# Patient Record
Sex: FEMALE | Race: BLACK OR AFRICAN AMERICAN | NOT HISPANIC OR LATINO | URBAN - METROPOLITAN AREA
[De-identification: names, ages, dates, MRNs, and addresses within clinical notes are randomized per-mention and may not be internally consistent; named-entity substitution may affect disease eponyms.]

---

## 2021-09-01 ENCOUNTER — INPATIENT (INPATIENT)
Facility: HOSPITAL | Age: 71
LOS: 6 days | Discharge: HOME | DRG: 872 | End: 2021-09-08
Attending: INTERNAL MEDICINE | Admitting: INTERNAL MEDICINE
Payer: MEDICARE

## 2021-09-01 VITALS
SYSTOLIC BLOOD PRESSURE: 158 MMHG | WEIGHT: 199.96 LBS | TEMPERATURE: 98 F | DIASTOLIC BLOOD PRESSURE: 72 MMHG | HEART RATE: 100 BPM | HEIGHT: 62 IN | RESPIRATION RATE: 18 BRPM | OXYGEN SATURATION: 97 %

## 2021-09-01 DIAGNOSIS — K11.20 SIALOADENITIS, UNSPECIFIED: ICD-10-CM

## 2021-09-01 DIAGNOSIS — Z89.512 ACQUIRED ABSENCE OF LEFT LEG BELOW KNEE: Chronic | ICD-10-CM

## 2021-09-01 LAB
ALBUMIN SERPL ELPH-MCNC: 3.5 G/DL — SIGNIFICANT CHANGE UP (ref 3.3–5)
ALP SERPL-CCNC: 82 U/L — SIGNIFICANT CHANGE UP (ref 40–120)
ALT FLD-CCNC: 24 U/L — SIGNIFICANT CHANGE UP (ref 10–45)
ANION GAP SERPL CALC-SCNC: 14 MMOL/L — SIGNIFICANT CHANGE UP (ref 5–17)
ANION GAP SERPL CALC-SCNC: 16 MMOL/L — SIGNIFICANT CHANGE UP (ref 5–17)
AST SERPL-CCNC: 50 U/L — HIGH (ref 10–40)
BASE EXCESS BLDV CALC-SCNC: 5.3 MMOL/L — HIGH (ref -2–2)
BASOPHILS # BLD AUTO: 0.05 K/UL — SIGNIFICANT CHANGE UP (ref 0–0.2)
BASOPHILS NFR BLD AUTO: 0.4 % — SIGNIFICANT CHANGE UP (ref 0–2)
BILIRUB SERPL-MCNC: 0.4 MG/DL — SIGNIFICANT CHANGE UP (ref 0.2–1.2)
BUN SERPL-MCNC: 10 MG/DL — SIGNIFICANT CHANGE UP (ref 7–23)
BUN SERPL-MCNC: 12 MG/DL — SIGNIFICANT CHANGE UP (ref 7–23)
CA-I SERPL-SCNC: 1.15 MMOL/L — SIGNIFICANT CHANGE UP (ref 1.15–1.33)
CALCIUM SERPL-MCNC: 9.2 MG/DL — SIGNIFICANT CHANGE UP (ref 8.4–10.5)
CALCIUM SERPL-MCNC: 9.2 MG/DL — SIGNIFICANT CHANGE UP (ref 8.4–10.5)
CHLORIDE BLDV-SCNC: 104 MMOL/L — SIGNIFICANT CHANGE UP (ref 96–108)
CHLORIDE SERPL-SCNC: 100 MMOL/L — SIGNIFICANT CHANGE UP (ref 96–108)
CHLORIDE SERPL-SCNC: 99 MMOL/L — SIGNIFICANT CHANGE UP (ref 96–108)
CO2 BLDV-SCNC: 31 MMOL/L — HIGH (ref 22–26)
CO2 SERPL-SCNC: 23 MMOL/L — SIGNIFICANT CHANGE UP (ref 22–31)
CO2 SERPL-SCNC: 25 MMOL/L — SIGNIFICANT CHANGE UP (ref 22–31)
CREAT SERPL-MCNC: 0.85 MG/DL — SIGNIFICANT CHANGE UP (ref 0.5–1.3)
CREAT SERPL-MCNC: 0.95 MG/DL — SIGNIFICANT CHANGE UP (ref 0.5–1.3)
EOSINOPHIL # BLD AUTO: 0.32 K/UL — SIGNIFICANT CHANGE UP (ref 0–0.5)
EOSINOPHIL NFR BLD AUTO: 2.3 % — SIGNIFICANT CHANGE UP (ref 0–6)
GAS PNL BLDV: 134 MMOL/L — LOW (ref 136–145)
GAS PNL BLDV: SIGNIFICANT CHANGE UP
GAS PNL BLDV: SIGNIFICANT CHANGE UP
GLUCOSE BLDV-MCNC: 123 MG/DL — HIGH (ref 70–99)
GLUCOSE SERPL-MCNC: 128 MG/DL — HIGH (ref 70–99)
GLUCOSE SERPL-MCNC: 242 MG/DL — HIGH (ref 70–99)
HCO3 BLDV-SCNC: 30 MMOL/L — HIGH (ref 22–29)
HCT VFR BLD CALC: 36 % — SIGNIFICANT CHANGE UP (ref 34.5–45)
HCT VFR BLDA CALC: 35 % — SIGNIFICANT CHANGE UP (ref 34.5–46.5)
HGB BLD CALC-MCNC: 11.6 G/DL — LOW (ref 11.7–16.1)
HGB BLD-MCNC: 11.1 G/DL — LOW (ref 11.5–15.5)
IMM GRANULOCYTES NFR BLD AUTO: 0.6 % — SIGNIFICANT CHANGE UP (ref 0–1.5)
LACTATE BLDV-MCNC: 1.1 MMOL/L — SIGNIFICANT CHANGE UP (ref 0.7–2)
LYMPHOCYTES # BLD AUTO: 14.8 % — SIGNIFICANT CHANGE UP (ref 13–44)
LYMPHOCYTES # BLD AUTO: 2.1 K/UL — SIGNIFICANT CHANGE UP (ref 1–3.3)
MCHC RBC-ENTMCNC: 26.4 PG — LOW (ref 27–34)
MCHC RBC-ENTMCNC: 30.8 GM/DL — LOW (ref 32–36)
MCV RBC AUTO: 85.5 FL — SIGNIFICANT CHANGE UP (ref 80–100)
MONOCYTES # BLD AUTO: 0.94 K/UL — HIGH (ref 0–0.9)
MONOCYTES NFR BLD AUTO: 6.6 % — SIGNIFICANT CHANGE UP (ref 2–14)
NEUTROPHILS # BLD AUTO: 10.69 K/UL — HIGH (ref 1.8–7.4)
NEUTROPHILS NFR BLD AUTO: 75.3 % — SIGNIFICANT CHANGE UP (ref 43–77)
NRBC # BLD: 0 /100 WBCS — SIGNIFICANT CHANGE UP (ref 0–0)
PCO2 BLDV: 43 MMHG — HIGH (ref 39–42)
PH BLDV: 7.45 — HIGH (ref 7.32–7.43)
PLATELET # BLD AUTO: 236 K/UL — SIGNIFICANT CHANGE UP (ref 150–400)
PO2 BLDV: 37 MMHG — SIGNIFICANT CHANGE UP (ref 25–45)
POTASSIUM BLDV-SCNC: 4.9 MMOL/L — SIGNIFICANT CHANGE UP (ref 3.5–5.1)
POTASSIUM SERPL-MCNC: 4.2 MMOL/L — SIGNIFICANT CHANGE UP (ref 3.5–5.3)
POTASSIUM SERPL-MCNC: 5.8 MMOL/L — HIGH (ref 3.5–5.3)
POTASSIUM SERPL-SCNC: 4.2 MMOL/L — SIGNIFICANT CHANGE UP (ref 3.5–5.3)
POTASSIUM SERPL-SCNC: 5.8 MMOL/L — HIGH (ref 3.5–5.3)
PROT SERPL-MCNC: 8.3 G/DL — SIGNIFICANT CHANGE UP (ref 6–8.3)
RBC # BLD: 4.21 M/UL — SIGNIFICANT CHANGE UP (ref 3.8–5.2)
RBC # FLD: 15.9 % — HIGH (ref 10.3–14.5)
SAO2 % BLDV: 65.9 % — LOW (ref 67–88)
SARS-COV-2 RNA SPEC QL NAA+PROBE: SIGNIFICANT CHANGE UP
SODIUM SERPL-SCNC: 138 MMOL/L — SIGNIFICANT CHANGE UP (ref 135–145)
SODIUM SERPL-SCNC: 139 MMOL/L — SIGNIFICANT CHANGE UP (ref 135–145)
WBC # BLD: 14.19 K/UL — HIGH (ref 3.8–10.5)
WBC # FLD AUTO: 14.19 K/UL — HIGH (ref 3.8–10.5)

## 2021-09-01 PROCEDURE — 70487 CT MAXILLOFACIAL W/DYE: CPT | Mod: 26,MG

## 2021-09-01 PROCEDURE — G1004: CPT

## 2021-09-01 RX ORDER — MORPHINE SULFATE 50 MG/1
2 CAPSULE, EXTENDED RELEASE ORAL EVERY 6 HOURS
Refills: 0 | Status: DISCONTINUED | OUTPATIENT
Start: 2021-09-01 | End: 2021-09-01

## 2021-09-01 RX ORDER — AMPICILLIN SODIUM AND SULBACTAM SODIUM 250; 125 MG/ML; MG/ML
3 INJECTION, POWDER, FOR SUSPENSION INTRAMUSCULAR; INTRAVENOUS ONCE
Refills: 0 | Status: COMPLETED | OUTPATIENT
Start: 2021-09-01 | End: 2021-09-01

## 2021-09-01 RX ORDER — DEXAMETHASONE 0.5 MG/5ML
10 ELIXIR ORAL ONCE
Refills: 0 | Status: COMPLETED | OUTPATIENT
Start: 2021-09-01 | End: 2021-09-01

## 2021-09-01 RX ORDER — MORPHINE SULFATE 50 MG/1
4 CAPSULE, EXTENDED RELEASE ORAL ONCE
Refills: 0 | Status: DISCONTINUED | OUTPATIENT
Start: 2021-09-01 | End: 2021-09-01

## 2021-09-01 RX ORDER — DEXAMETHASONE 0.5 MG/5ML
10 ELIXIR ORAL EVERY 8 HOURS
Refills: 0 | Status: DISCONTINUED | OUTPATIENT
Start: 2021-09-01 | End: 2021-09-02

## 2021-09-01 RX ORDER — KETOROLAC TROMETHAMINE 30 MG/ML
15 SYRINGE (ML) INJECTION ONCE
Refills: 0 | Status: DISCONTINUED | OUTPATIENT
Start: 2021-09-01 | End: 2021-09-01

## 2021-09-01 RX ORDER — ATORVASTATIN CALCIUM 80 MG/1
40 TABLET, FILM COATED ORAL AT BEDTIME
Refills: 0 | Status: DISCONTINUED | OUTPATIENT
Start: 2021-09-01 | End: 2021-09-08

## 2021-09-01 RX ORDER — AMPICILLIN SODIUM AND SULBACTAM SODIUM 250; 125 MG/ML; MG/ML
3 INJECTION, POWDER, FOR SUSPENSION INTRAMUSCULAR; INTRAVENOUS EVERY 6 HOURS
Refills: 0 | Status: DISCONTINUED | OUTPATIENT
Start: 2021-09-01 | End: 2021-09-04

## 2021-09-01 RX ORDER — NIFEDIPINE 30 MG
60 TABLET, EXTENDED RELEASE 24 HR ORAL DAILY
Refills: 0 | Status: DISCONTINUED | OUTPATIENT
Start: 2021-09-01 | End: 2021-09-08

## 2021-09-01 RX ORDER — SODIUM CHLORIDE 9 MG/ML
1000 INJECTION, SOLUTION INTRAVENOUS
Refills: 0 | Status: DISCONTINUED | OUTPATIENT
Start: 2021-09-01 | End: 2021-09-02

## 2021-09-01 RX ORDER — KETOROLAC TROMETHAMINE 30 MG/ML
30 SYRINGE (ML) INJECTION ONCE
Refills: 0 | Status: DISCONTINUED | OUTPATIENT
Start: 2021-09-01 | End: 2021-09-01

## 2021-09-01 RX ORDER — ACETAMINOPHEN 500 MG
1000 TABLET ORAL ONCE
Refills: 0 | Status: COMPLETED | OUTPATIENT
Start: 2021-09-01 | End: 2021-09-01

## 2021-09-01 RX ADMIN — MORPHINE SULFATE 4 MILLIGRAM(S): 50 CAPSULE, EXTENDED RELEASE ORAL at 11:42

## 2021-09-01 RX ADMIN — AMPICILLIN SODIUM AND SULBACTAM SODIUM 200 GRAM(S): 250; 125 INJECTION, POWDER, FOR SUSPENSION INTRAMUSCULAR; INTRAVENOUS at 09:37

## 2021-09-01 RX ADMIN — Medication 102 MILLIGRAM(S): at 17:38

## 2021-09-01 RX ADMIN — Medication 400 MILLIGRAM(S): at 08:45

## 2021-09-01 RX ADMIN — Medication 10 MILLIGRAM(S): at 11:42

## 2021-09-01 RX ADMIN — MORPHINE SULFATE 4 MILLIGRAM(S): 50 CAPSULE, EXTENDED RELEASE ORAL at 10:58

## 2021-09-01 RX ADMIN — Medication 15 MILLIGRAM(S): at 11:44

## 2021-09-01 RX ADMIN — AMPICILLIN SODIUM AND SULBACTAM SODIUM 200 GRAM(S): 250; 125 INJECTION, POWDER, FOR SUSPENSION INTRAMUSCULAR; INTRAVENOUS at 21:05

## 2021-09-01 RX ADMIN — AMPICILLIN SODIUM AND SULBACTAM SODIUM 200 GRAM(S): 250; 125 INJECTION, POWDER, FOR SUSPENSION INTRAMUSCULAR; INTRAVENOUS at 15:19

## 2021-09-01 RX ADMIN — Medication 1000 MILLIGRAM(S): at 10:00

## 2021-09-01 RX ADMIN — MORPHINE SULFATE 2 MILLIGRAM(S): 50 CAPSULE, EXTENDED RELEASE ORAL at 14:56

## 2021-09-01 RX ADMIN — Medication 102 MILLIGRAM(S): at 10:58

## 2021-09-01 RX ADMIN — AMPICILLIN SODIUM AND SULBACTAM SODIUM 3 GRAM(S): 250; 125 INJECTION, POWDER, FOR SUSPENSION INTRAMUSCULAR; INTRAVENOUS at 10:54

## 2021-09-01 RX ADMIN — Medication 1000 MILLIGRAM(S): at 09:45

## 2021-09-01 RX ADMIN — SODIUM CHLORIDE 125 MILLILITER(S): 9 INJECTION, SOLUTION INTRAVENOUS at 15:18

## 2021-09-01 NOTE — ED CDU PROVIDER INITIAL DAY NOTE - ATTENDING CONTRIBUTION TO CARE
Attending MD Maldonado:   I personally have seen and examined this patient.  Physician assistant note reviewed and agree on plan of care and except where noted.

## 2021-09-01 NOTE — ED CDU PROVIDER INITIAL DAY NOTE - DETAILS
70y f p/w sialodochitis and facial cellulitis:  -IV unasyn, decadron, ENT following, am labs    d/w ED team

## 2021-09-01 NOTE — ED CDU PROVIDER DISPOSITION NOTE - CLINICAL COURSE
71 yo female with PMHx of HTN, HLD, PAD s/p Left BKA p/w right facial swelling.  Patient reports that she has been having dental work over the past few weeks.  Everything had been going well up until last week.  Patient was seen by her dentist again last Tuesday, two days later she noticed right facial swelling.  Went back to her dentist who sent her to an oral surgeon yesterday.  Oral surgeon recommended patient come to the ER.  Patient denies fevers/chills, difficulty breathing/swallowing, CP, SOB, abd pain, NVD.  Patient was started on Amoxicillin prior to procedure and completed course last week without improvement.  In ED pt had leukocytosis of 14, CT max/face revealing for sialodochitis with facial cellulitis, limited eval of stone due to artifact. Pt was seen by ENT recommended IV unasyn and decadron. Pt went to CDU for further management  In CDU, 69 yo female with PMHx of HTN, HLD, PAD s/p Left BKA p/w right facial swelling.  Patient reports that she has been having dental work over the past few weeks.  Everything had been going well up until last week.  Patient was seen by her dentist again last Tuesday, two days later she noticed right facial swelling.  Went back to her dentist who sent her to an oral surgeon yesterday.  Oral surgeon recommended patient come to the ER.  Patient denies fevers/chills, difficulty breathing/swallowing, CP, SOB, abd pain, NVD.  Patient was started on Amoxicillin prior to procedure and completed course last week without improvement.  In ED pt had leukocytosis of 14, CT max/face revealing for sialodochitis with facial cellulitis, limited eval of stone due to artifact. Pt was seen by ENT recommended IV unasyn and decadron. Pt went to CDU for further management  In CDU, patient's symptoms slightly improved, but still with significant amount of facial swelling.  ENT re-evaluation recommending admission for continued IV abx and steroids.

## 2021-09-01 NOTE — ED ADULT TRIAGE NOTE - CHIEF COMPLAINT QUOTE
swelling on right side of face. Saw dentist and surgeon who informed her it was beyond the capabilities of the office and to come to the ED. Able to manage secretions

## 2021-09-01 NOTE — ED ADULT NURSE NOTE - NSIMPLEMENTINTERV_GEN_ALL_ED
Implemented All Fall Risk Interventions:  [place] to call system. Call bell, personal items and telephone within reach. Instruct patient to call for assistance. Room bathroom lighting operational. Non-slip footwear when patient is off stretcher. Physically safe environment: no spills, clutter or unnecessary equipment. Stretcher in lowest position, wheels locked, appropriate side rails in place. Provide visual cue, wrist band, yellow gown, etc. Monitor gait and stability. Monitor for mental status changes and reorient to person, place, and time. Review medications for side effects contributing to fall risk. Reinforce activity limits and safety measures with patient and family.

## 2021-09-01 NOTE — ED CDU PROVIDER INITIAL DAY NOTE - PROGRESS NOTE DETAILS
CDU PROGRESS NOTE REY BURRIS: pt resting comfortably, pain well controlled. NAD. VSS. Will continue to monitor. Patient seen at bedside. Patient resting comfortably, no complaints. Able to PO tolerate. Per patient states that she feels like she is improving.  Will reevaluate. - Sabina Ag PA-C

## 2021-09-01 NOTE — ED CDU PROVIDER INITIAL DAY NOTE - MEDICAL DECISION MAKING DETAILS
Attending MD Maldonado: 71 yo female with PMH sign for HTN, HLD, PAD and L BKA presents with right sided facial swelling sp dental work over last few weeks.  Seen by OMFS today and sent to ED.  Patient denies fever, chills, nausea, vomiting, or diarrhea. On exam there is significant right sided facial swelling and trismus.  Maxillofacial CT  ordered and showed  Tubular rim-enhancing collection within the expected course of the right parotid duct, most compatible with sialodochitis and adjacent cellulitis.  Patient started on IV abx and ENT consulted.  Recommended CDU for continued IV abx and observation.

## 2021-09-01 NOTE — CONSULT NOTE ADULT - ATTENDING COMMENTS
pt with severe right sided sialoadenitis with obstructed duct full of fluid/ saliva. having some improvement on abx over the day, not yet draining, discussed probing and dilating duct, pt very very tender and would like to see if will continue to improve and may decompress. will add some steroids, if does not continue to improve/ se agrees to try dilation or drainage

## 2021-09-01 NOTE — ED PROVIDER NOTE - CLINICAL SUMMARY MEDICAL DECISION MAKING FREE TEXT BOX
Attending MD Maldonado: 71 yo female with PMH sign for HTN, HLD, PAD and L BKA presents with right sided facial swelling sp dental work over last few weeks.  Seen by OMFS today and sent to ED.  Patient denies fever, chills, nausea, vomiting, or diarrhea. On exam there is significant right sided facial swelling and trismus.  Maxillofacial CT  ordered and showed  Tubular rim-enhancing collection within the expected course of the right parotid duct, most compatible with sialodochitis and adjacent cellulitis.  Patient started on IV abx and ENT consulted.

## 2021-09-01 NOTE — ED CDU PROVIDER DISPOSITION NOTE - ATTENDING CONTRIBUTION TO CARE
Attending Bulmaro: pt re-evaluated by ENT in AM of 9/2, recommending medical admission for continued treatment and management

## 2021-09-01 NOTE — ED PROVIDER NOTE - ATTENDING CONTRIBUTION TO CARE
Attending MD Maldonado:  I personally have seen and examined this patient.  Resident note reviewed and agree on plan of care and except where noted. Attending MD Maldonado:  I personally have seen and examined this patient.  PA note reviewed and agree on plan of care and except where noted.

## 2021-09-01 NOTE — ED ADULT NURSE NOTE - OBJECTIVE STATEMENT
69 y/o F, PMH HTN, HLD, PAD s/p L BKA, presents to ED c/o R jaw/cheek pain and swelling since Thursday in the setting of getting recent dental work on Tuesday 2 days prior. Pt has significant swelling from R cheek towards R ear and down R jaw. Pt denies dysphagia, wheezing, SOB, difficulty speaking/breathing, fevers, chills. Pt speaking in full clear sentences, maintaining secretions. Safety maintained.

## 2021-09-01 NOTE — ED PROVIDER NOTE - PHYSICAL EXAMINATION
Gen: AAO x 3, NAD  Skin: No rashes or lesions  HEENT: NC/AT, PERRLA, EOMI, MMM, right facial swelling, induration, +TTP, no fluctuance noted.  Submandibular area soft, nontender.    Resp: unlabored CTAB  Cardiac: rrr s1s2, no murmurs, rubs or gallops  GI: ND, +BS, Soft, NT  Ext: Left BKA, FROM in all extremities  Neuro: no focal deficits

## 2021-09-01 NOTE — ED CDU PROVIDER DISPOSITION NOTE - NSFOLLOWUPINSTRUCTIONS_ED_ALL_ED_FT
Please follow up with your primary care doctor within 1 week.  Follow up with ENT within 1 week    *Bring all printed lab/test results to your appointment(s).*    Take antibiotics as prescribed (Please read all medication information/instructions).    Return to the ED for worsening swelling, difficulty swallowing, fever, chills, or any other concerns.

## 2021-09-01 NOTE — ED PROVIDER NOTE - OBJECTIVE STATEMENT
71 yo female with PMHx of HTN, HLD, PAD s/p Left BKA p/w right facial swelling.  Patient reports that she has been having dental work over the past few weeks.  Everything had been going well up until last week.  Patient was seen by her dentist again last Tuesday, two days later she noticed right facial swelling.  Went back to her dentist who sent her to an oral surgeon yesterday.  Oral surgeon recommended patient come to the ER.  Patient denies fevers/chills, difficulty breathing/swallowing, CP, SOB, abd pain, NVD.  Patient was started on Amoxicillin prior to procedure and completed course last week without improvement.

## 2021-09-01 NOTE — ED CDU PROVIDER INITIAL DAY NOTE - OBJECTIVE STATEMENT
69 yo female with PMHx of HTN, HLD, PAD s/p Left BKA p/w right facial swelling.  Patient reports that she has been having dental work over the past few weeks.  Everything had been going well up until last week.  Patient was seen by her dentist again last Tuesday, two days later she noticed right facial swelling.  Went back to her dentist who sent her to an oral surgeon yesterday.  Oral surgeon recommended patient come to the ER.  Patient denies fevers/chills, difficulty breathing/swallowing, CP, SOB, abd pain, NVD.  Patient was started on Amoxicillin prior to procedure and completed course last week without improvement.  In ED pt had leukocytosis of 14, CT max/face revealing for sialodochitis with facial cellulitis, limited eval of stone due to artifact. Pt was seen by ENT recommended IV unasyn and decadron. Pt went to CDU for further management

## 2021-09-01 NOTE — CONSULT NOTE ADULT - PROBLEM SELECTOR RECOMMENDATION 9
-CDU for IV abx and decadron  -S/p 1 dose of IV unasyn on arrival, continue w abx  -Decadron 10 mg q 8 hours x at least 24 hours  -Further disposition dependent on response to iV medications  -Warm compress to R face at least 4 times daily  -PO intake encouraged, soft diet as tolerated  -Adequate hydration  -Sialogogues (sour candies, lemon etc.) to stimulate salivary flow  -Will work to obtain culture if pus expressed from stensons duct   -Pain control

## 2021-09-02 DIAGNOSIS — E78.5 HYPERLIPIDEMIA, UNSPECIFIED: ICD-10-CM

## 2021-09-02 DIAGNOSIS — I10 ESSENTIAL (PRIMARY) HYPERTENSION: ICD-10-CM

## 2021-09-02 DIAGNOSIS — K11.20 SIALOADENITIS, UNSPECIFIED: ICD-10-CM

## 2021-09-02 DIAGNOSIS — Z96.642 PRESENCE OF LEFT ARTIFICIAL HIP JOINT: Chronic | ICD-10-CM

## 2021-09-02 DIAGNOSIS — Z89.512 ACQUIRED ABSENCE OF LEFT LEG BELOW KNEE: ICD-10-CM

## 2021-09-02 LAB
ANION GAP SERPL CALC-SCNC: 14 MMOL/L — SIGNIFICANT CHANGE UP (ref 5–17)
BASE EXCESS BLDV CALC-SCNC: 4.8 MMOL/L — HIGH (ref -2–2)
BUN SERPL-MCNC: 13 MG/DL — SIGNIFICANT CHANGE UP (ref 7–23)
CA-I SERPL-SCNC: 1.16 MMOL/L — SIGNIFICANT CHANGE UP (ref 1.15–1.33)
CALCIUM SERPL-MCNC: 8.3 MG/DL — LOW (ref 8.4–10.5)
CHLORIDE BLDV-SCNC: 103 MMOL/L — SIGNIFICANT CHANGE UP (ref 96–108)
CHLORIDE SERPL-SCNC: 103 MMOL/L — SIGNIFICANT CHANGE UP (ref 96–108)
CO2 BLDV-SCNC: 32 MMOL/L — HIGH (ref 22–26)
CO2 SERPL-SCNC: 22 MMOL/L — SIGNIFICANT CHANGE UP (ref 22–31)
CREAT SERPL-MCNC: 0.69 MG/DL — SIGNIFICANT CHANGE UP (ref 0.5–1.3)
GAS PNL BLDV: 137 MMOL/L — SIGNIFICANT CHANGE UP (ref 136–145)
GAS PNL BLDV: SIGNIFICANT CHANGE UP
GAS PNL BLDV: SIGNIFICANT CHANGE UP
GLUCOSE BLDV-MCNC: 191 MG/DL — HIGH (ref 70–99)
GLUCOSE SERPL-MCNC: 180 MG/DL — HIGH (ref 70–99)
HCO3 BLDV-SCNC: 30 MMOL/L — HIGH (ref 22–29)
HCT VFR BLD CALC: 33.7 % — LOW (ref 34.5–45)
HCT VFR BLDA CALC: 32 % — LOW (ref 34.5–46.5)
HGB BLD CALC-MCNC: 10.8 G/DL — LOW (ref 11.7–16.1)
HGB BLD-MCNC: 10.6 G/DL — LOW (ref 11.5–15.5)
LACTATE BLDV-MCNC: 0.9 MMOL/L — SIGNIFICANT CHANGE UP (ref 0.7–2)
MCHC RBC-ENTMCNC: 26.6 PG — LOW (ref 27–34)
MCHC RBC-ENTMCNC: 31.5 GM/DL — LOW (ref 32–36)
MCV RBC AUTO: 84.7 FL — SIGNIFICANT CHANGE UP (ref 80–100)
NRBC # BLD: 0 /100 WBCS — SIGNIFICANT CHANGE UP (ref 0–0)
PCO2 BLDV: 49 MMHG — HIGH (ref 39–42)
PH BLDV: 7.4 — SIGNIFICANT CHANGE UP (ref 7.32–7.43)
PLATELET # BLD AUTO: 240 K/UL — SIGNIFICANT CHANGE UP (ref 150–400)
PO2 BLDV: 42 MMHG — SIGNIFICANT CHANGE UP (ref 25–45)
POTASSIUM BLDV-SCNC: 4.1 MMOL/L — SIGNIFICANT CHANGE UP (ref 3.5–5.1)
POTASSIUM SERPL-MCNC: 4.2 MMOL/L — SIGNIFICANT CHANGE UP (ref 3.5–5.3)
POTASSIUM SERPL-SCNC: 4.2 MMOL/L — SIGNIFICANT CHANGE UP (ref 3.5–5.3)
RBC # BLD: 3.98 M/UL — SIGNIFICANT CHANGE UP (ref 3.8–5.2)
RBC # FLD: 15.2 % — HIGH (ref 10.3–14.5)
SAO2 % BLDV: 70.6 % — SIGNIFICANT CHANGE UP (ref 67–88)
SODIUM SERPL-SCNC: 139 MMOL/L — SIGNIFICANT CHANGE UP (ref 135–145)
WBC # BLD: 16.37 K/UL — HIGH (ref 3.8–10.5)
WBC # FLD AUTO: 16.37 K/UL — HIGH (ref 3.8–10.5)

## 2021-09-02 PROCEDURE — 99222 1ST HOSP IP/OBS MODERATE 55: CPT

## 2021-09-02 RX ORDER — SENNA PLUS 8.6 MG/1
1 TABLET ORAL DAILY
Refills: 0 | Status: DISCONTINUED | OUTPATIENT
Start: 2021-09-02 | End: 2021-09-08

## 2021-09-02 RX ORDER — ENOXAPARIN SODIUM 100 MG/ML
40 INJECTION SUBCUTANEOUS DAILY
Refills: 0 | Status: DISCONTINUED | OUTPATIENT
Start: 2021-09-02 | End: 2021-09-08

## 2021-09-02 RX ORDER — ASCORBIC ACID 60 MG
500 TABLET,CHEWABLE ORAL DAILY
Refills: 0 | Status: DISCONTINUED | OUTPATIENT
Start: 2021-09-02 | End: 2021-09-02

## 2021-09-02 RX ORDER — ASCORBIC ACID 60 MG
500 TABLET,CHEWABLE ORAL DAILY
Refills: 0 | Status: DISCONTINUED | OUTPATIENT
Start: 2021-09-02 | End: 2021-09-08

## 2021-09-02 RX ORDER — INFLUENZA VIRUS VACCINE 15; 15; 15; 15 UG/.5ML; UG/.5ML; UG/.5ML; UG/.5ML
0.5 SUSPENSION INTRAMUSCULAR ONCE
Refills: 0 | Status: COMPLETED | OUTPATIENT
Start: 2021-09-02 | End: 2021-09-02

## 2021-09-02 RX ORDER — FAMOTIDINE 10 MG/ML
20 INJECTION INTRAVENOUS
Refills: 0 | Status: DISCONTINUED | OUTPATIENT
Start: 2021-09-02 | End: 2021-09-06

## 2021-09-02 RX ORDER — CHOLECALCIFEROL (VITAMIN D3) 125 MCG
1000 CAPSULE ORAL DAILY
Refills: 0 | Status: DISCONTINUED | OUTPATIENT
Start: 2021-09-02 | End: 2021-09-08

## 2021-09-02 RX ORDER — LORATADINE 10 MG/1
10 TABLET ORAL DAILY
Refills: 0 | Status: DISCONTINUED | OUTPATIENT
Start: 2021-09-02 | End: 2021-09-08

## 2021-09-02 RX ORDER — DEXAMETHASONE 0.5 MG/5ML
10 ELIXIR ORAL EVERY 8 HOURS
Refills: 0 | Status: COMPLETED | OUTPATIENT
Start: 2021-09-02 | End: 2021-09-03

## 2021-09-02 RX ADMIN — AMPICILLIN SODIUM AND SULBACTAM SODIUM 200 GRAM(S): 250; 125 INJECTION, POWDER, FOR SUSPENSION INTRAMUSCULAR; INTRAVENOUS at 02:51

## 2021-09-02 RX ADMIN — Medication 102 MILLIGRAM(S): at 21:34

## 2021-09-02 RX ADMIN — Medication 60 MILLIGRAM(S): at 09:23

## 2021-09-02 RX ADMIN — ENOXAPARIN SODIUM 40 MILLIGRAM(S): 100 INJECTION SUBCUTANEOUS at 12:47

## 2021-09-02 RX ADMIN — LORATADINE 10 MILLIGRAM(S): 10 TABLET ORAL at 14:52

## 2021-09-02 RX ADMIN — AMPICILLIN SODIUM AND SULBACTAM SODIUM 200 GRAM(S): 250; 125 INJECTION, POWDER, FOR SUSPENSION INTRAMUSCULAR; INTRAVENOUS at 09:24

## 2021-09-02 RX ADMIN — MORPHINE SULFATE 2 MILLIGRAM(S): 50 CAPSULE, EXTENDED RELEASE ORAL at 08:05

## 2021-09-02 RX ADMIN — Medication 102 MILLIGRAM(S): at 01:00

## 2021-09-02 RX ADMIN — AMPICILLIN SODIUM AND SULBACTAM SODIUM 200 GRAM(S): 250; 125 INJECTION, POWDER, FOR SUSPENSION INTRAMUSCULAR; INTRAVENOUS at 21:34

## 2021-09-02 RX ADMIN — Medication 1 TABLET(S): at 15:38

## 2021-09-02 RX ADMIN — SODIUM CHLORIDE 125 MILLILITER(S): 9 INJECTION, SOLUTION INTRAVENOUS at 04:27

## 2021-09-02 RX ADMIN — Medication 1000 UNIT(S): at 14:52

## 2021-09-02 RX ADMIN — Medication 102 MILLIGRAM(S): at 14:53

## 2021-09-02 RX ADMIN — Medication 15 MILLIGRAM(S): at 08:05

## 2021-09-02 RX ADMIN — Medication 500 MILLIGRAM(S): at 14:52

## 2021-09-02 RX ADMIN — ATORVASTATIN CALCIUM 40 MILLIGRAM(S): 80 TABLET, FILM COATED ORAL at 21:42

## 2021-09-02 RX ADMIN — AMPICILLIN SODIUM AND SULBACTAM SODIUM 200 GRAM(S): 250; 125 INJECTION, POWDER, FOR SUSPENSION INTRAMUSCULAR; INTRAVENOUS at 15:56

## 2021-09-02 NOTE — ED CDU PROVIDER SUBSEQUENT DAY NOTE - NS ED ROS FT
Constitutional: No fever or chills  Eyes: No visual changes, eye pain   CV: No chest pain or lower extremity edema  Resp: No SOB no cough  GI: No abd pain. No nausea or vomiting.   : No dysuria, hematuria.   MSK: No musculoskeletal pain  Skin: +facial swelling  Psych: No complaints   Neuro: No headache. No new weakness.

## 2021-09-02 NOTE — ED CDU PROVIDER SUBSEQUENT DAY NOTE - PROGRESS NOTE DETAILS
Patient seen at bedside in NAD.  VSS.  Patient resting comfortably.  Patient reports that she is feeling better.  Still with a significant amount of right sided facial swelling.  Patient re-evaluated by ent who is recommending admission for continued IV abx and steroids.  -Binh Oro PA-C Attending Bulmaro: pt evaluated at bedside this AM. Reports symptoms improving since yesterday. seen by ENT this morning who recommended admission for continued treatment and management. pt amenable w/ plan

## 2021-09-02 NOTE — CONSULT NOTE ADULT - SUBJECTIVE AND OBJECTIVE BOX
CC: R facial swelling    HPI: 71 yo female with PMHx of HTN, HLD, PAD s/p Left BKA p/w right facial swelling.  Patient reports that she has been having dental work over the past few weeks.  Everything had been going well up until last week.  Patient was seen by her dentist again last Tuesday, two days later she noticed right facial swelling.  Went back to her dentist who sent her to an oral surgeon yesterday.  Oral surgeon recommended patient come to the ER.  Patient denies fevers/chills, difficulty breathing/swallowing, CP, SOB, abd pain, NVD.  Patient was started on Amoxicillin prior to procedure and completed course last week without improvement. CT max face shows R parotitis (full report below). ENT consulted for further recs      PAST MEDICAL & SURGICAL HISTORY:    Allergies    No Known Allergies    Intolerances      MEDICATIONS  (STANDING):  morphine  - Injectable 4 milliGRAM(s) IV Push Once    MEDICATIONS  (PRN):  ketorolac   Injectable 15 milliGRAM(s) IV Push Once PRN Severe Pain (7 - 10)      Social History: No reported toxic habits    Family history: No significant medical history in first degree relatives      ROS:   ENT: all negative except as noted in HPI   Skin: No itching, dryness, rash, changes to hair, or skin masses  CV: denies palpitations  Pulm: denies SOB, cough, hemoptysis  GI: denies change in appetite, indigestion, n/v  : denies pertinent urinary symptoms, urgency  Neuro: denies numbness/tingling, loss of sensation  Psych: denies anxiety  MS: denies muscle weakness, instability  Heme: denies easy bruising or bleeding  Endo: denies heat/cold intolerance, excessive sweating  Vascular: denies LE edema    Vital Signs Last 24 Hrs  T(C): 36.8 (01 Sep 2021 07:45), Max: 36.9 (01 Sep 2021 06:51)  T(F): 98.2 (01 Sep 2021 07:45), Max: 98.4 (01 Sep 2021 06:51)  HR: 87 (01 Sep 2021 07:45) (87 - 100)  BP: 148/59 (01 Sep 2021 07:45) (148/59 - 158/72)  BP(mean): --  RR: 15 (01 Sep 2021 07:45) (15 - 18)  SpO2: 98% (01 Sep 2021 07:45) (97% - 98%)                          11.1   14.19 )-----------( 236      ( 01 Sep 2021 08:05 )             36.0    09-01    139  |  100  |  12  ----------------------------<  128<H>  5.8<H>   |  25  |  0.95    Ca    9.2      01 Sep 2021 08:05    TPro  8.3  /  Alb  3.5  /  TBili  0.4  /  DBili  x   /  AST  50<H>  /  ALT  24  /  AlkPhos  82  09-01       PHYSICAL EXAM:  Gen: NAD  Skin: No rashes, bruises, or lesions  Head: Normocephalic, Atraumatic  Face: R parotid firm swelling with extension to cheek, tender to deep palpation   Eyes: no scleral injection  Nose: Nares bilaterally patent, no discharge  Mouth: No Stridor / Drooling / Trismus.  Mucosa moist, tongue/uvula midline, unable to express pus from stensons duct with deep palpation   Neck: Flat, supple, trachea midline, no masses  Lymphatic: R cervical adenopathy   Resp: breathing easily, no stridor  CV: no peripheral edema/cyanosis  GI: nondistended   Peripheral vascular: no JVD or edema  Neuro: facial nerve intact, no facial droop      IMAGING/ADDITIONAL STUDIES:   < from: CT Maxillofacial w/ IV Cont (09.01.21 @ 09:14) >  EXAM:  CT MAXILLOFACIAL  IC                            PROCEDURE DATE:  09/01/2021            INTERPRETATION:  .    CLINICAL INFORMATION: Right-sided facial swelling. Evaluate for abscess.    TECHNIQUE: Axial CT images were obtained through the paranasal sinuses, and orbits. 90 cc's of IV Omnipaque-350  was administered without immediate complication and 10 cc's was discarded. Coronal and sagittal reconstruction images were also obtained.    COMPARISON: None available.    FINDINGS: A tubular rim-enhancing low-attenuation collection is seen within the right side of the face within the expected course of the right parotid duct. Evaluation is limited secondary to streak and beam hardening artifact generated by dental amalgam and hardware. The collection measures approximately 6.5 x 1.1 x 1.8 cm (AP x TRV x CC). Evaluation for an obstructing radiopaque stone is extremely limited secondary to streak and beam hardening artifact. Adjacent inflammatory changes are noted within the subcutaneous tissues of the right face. There is also hyperenhancement and enlargement of the right parotid gland compared to the contralateral side. A tiny calcification is seen within the right parotid gland (series 3, image 72). Enlargement and edematous changes are also seen within the right masseter muscle. Reactive myositis of the right platysma muscle is also appreciated.  Large right-sided cervical lymph nodes are seen which are reactive.    Atrophy of the bilateral submandibular glands are appreciated. The imaged portions of the thyroid gland appear unremarkable.    No acute facial fractures are seen. The bilateral orbital rims and orbital floors are intact. The bilateral globes, extraocular muscles, optic nerves, and orbital fat appear within normal limits.    Bilateral uncinectomies and maxillary antrostomies are seen. Polyps versus retention cysts are seen within the bilateral maxillary sinuses. Aerated secretions are also noted within the left maxillary sinus. Scattered mucosal thickening and opacification are also seen within the ethmoid complex, more asymmetric towards the left side. A small area of probable arrested pneumatization is seen within the left sphenoid skull base. The mastoid air cells are clear.    The mandible and maxilla are intact. Bilateral TMJ arthropathy is appreciated.    Limited field-of-view through the intracranial structures demonstrates no gross abnormalities.    Degenerative changes of the cervical spine are seen. The posterior arch of C1 remains unfused.    IMPRESSION: Tubular rim-enhancing collection within the expected course of the right parotid duct, most compatible with sialodochitis. Evaluation for obstructing radiopaque stones within the course of the right parotid duct is extremely limited secondary to streak and beam hardening artifact generated by dental hardware/amalgam. Associated adjacent cellulitis of the right side of the face.    Right sided parotiditis.    Enlarged right-sided cervical lymph nodes which are reactive.    < end of copied text >  
Patient is a 70y old  Female who presents with a chief complaint of right facial swelling (02 Sep 2021 11:46)    From HPI" HPI:  69 yo female with PMH of HTN, HLD, PAD s/p Left BKA p/w right facial swelling.  Patient reports that she has been having dental work over the past few weeks.  Everything had been going well up until last week.  Patient was seen by her dentist again last Tuesday, two days later she noticed right facial swelling.  Went back to her dentist who sent her to an oral surgeon yesterday.  Oral surgeon recommended patient come to the Emergency Department.  Patient was started on Amoxicillin prior to procedure and completed course last week without improvement (02 Sep 2021 11:46)  "    Above verified-agree with above unless noted below.    ID consulted     PAST MEDICAL & SURGICAL HISTORY:  Mild HTN    HLD (hyperlipidemia)    S/P below knee amputation, left        Social history:   denies   Smoking,    ETOH,      IVDU       FAMILY HISTORY:  FH: colon cancer (Mother)    - Family history of medical problems in all first degree relatives reviewed.None of these were found to be related to patients current illness.    REVIEW OF SYSTEMS  General:	+malaise fatigue or chills. Fevers absent    Skin:No rash  	  Ophthalmologic:Denies any visual complaints,discharge redness or photophobia  	  ENMT:No nasal discharge,headache,sinus congestion or throat pain.No dental complaints  Facial swellinga s noted above     Respiratory and Thorax:No cough,sputum or chest pain.Denies shortness of breath  	  Cardiovascular:	No chest pain,palpitaions or dizziness    Gastrointestinal:	NO nausea,abdominal pain or diarrhea.    Genitourinary:	No dysuria,frequency. No flank pain    Musculoskeletal:	No joint swelling or pain.No weakness    Neurological:No confusion,diziness.No extremity weakness.No bladder or bowel incontinence	          Endocrine:	No recent weight gain or loss.No abnormal heat/cold intolerance    Allergic/Immunologic:	No hives or rash   Allergies    No Known Allergies    Intolerances        Antimicrobials:    ampicillin/sulbactam  IVPB 3 Gram(s) IV Intermittent every 6 hours      Prior Antimicrobials:  MEDICATIONS  (STANDING):  ampicillin/sulbactam  IVPB   200 mL/Hr IV Intermittent (09-01-21 @ 09:37)    ampicillin/sulbactam  IVPB   200 mL/Hr IV Intermittent (09-02-21 @ 09:24)   200 mL/Hr IV Intermittent (09-02-21 @ 02:51)   200 mL/Hr IV Intermittent (09-01-21 @ 21:05)   200 mL/Hr IV Intermittent (09-01-21 @ 15:19)      Other medications reviewed  MEDICATIONS  (STANDING):  ampicillin/sulbactam  IVPB 3 Gram(s) IV Intermittent every 6 hours  ascorbic acid 500 milliGRAM(s) Oral daily  atorvastatin 40 milliGRAM(s) Oral at bedtime  calcium carbonate 1250 mG  + Vitamin D (OsCal 500 + D) 1 Tablet(s) Oral daily  cholecalciferol 1000 Unit(s) Oral daily  dexAMETHasone  IVPB 10 milliGRAM(s) IV Intermittent every 8 hours  enoxaparin Injectable 40 milliGRAM(s) SubCutaneous daily  loratadine 10 milliGRAM(s) Oral daily  NIFEdipine XL 60 milliGRAM(s) Oral daily        Vital Signs Last 24 Hrs  T(C): 36.9 (02 Sep 2021 12:57), Max: 36.9 (02 Sep 2021 12:57)  T(F): 98.4 (02 Sep 2021 12:57), Max: 98.4 (02 Sep 2021 12:57)  HR: 83 (02 Sep 2021 12:57) (75 - 85)  BP: 130/63 (02 Sep 2021 12:57) (128/58 - 154/70)  BP(mean): 96 (02 Sep 2021 00:35) (78 - 96)  RR: 19 (02 Sep 2021 12:57) (17 - 20)  SpO2: 96% (02 Sep 2021 12:57) (95% - 100%)    PHYSICAL EXAM:Pleasant patient in no acute distress.      Constitutional:Comfortable.Awake and alert  No cachexia     Eyes:PERRL EOMI.NO discharge or conjunctival injection    ENMT:No sinus tenderness.No thrush.  No gum swelling  R parotid area and R maxillary/infraorbitals welling with tenderness  No intra oral discharge noted    Neck:Supple,No LN,no JVD      Respiratory:Good air entry bilaterally,CTA    Cardiovascular:S1 S2 wnl, No murmurs,rub or gallops    Gastrointestinal:Soft BS(+) no tenderness no masses ,No rebound or guarding    Genitourinary:No CVA tendereness         Extremities: R BKA no tenderness or discharge      Vascular:peripheral pulses felt    Neurological:AAO X 3,No grossly focal deficits            Musculoskeletal:No joint swelling or LOM              Labs:                            10.6   16.37 )-----------( 240      ( 02 Sep 2021 06:28 )             33.7     WBC Count: 16.37 (09-02-21 @ 06:28)  WBC Count: 14.19 (09-01-21 @ 08:05)      09-02    139  |  103  |  13  ----------------------------<  180<H>  4.2   |  22  |  0.69    Ca    8.3<L>      02 Sep 2021 06:28    TPro  8.3  /  Alb  3.5  /  TBili  0.4  /  DBili  x   /  AST  50<H>  /  ALT  24  /  AlkPhos  82  09-01            Culture - Blood (collected 01 Sep 2021 11:14)  Source: .Blood Blood-Venous  Preliminary Report (02 Sep 2021 12:01):    No growth to date.      < from: CT Maxillofacial w/ IV Cont (09.01.21 @ 09:14) >    IMPRESSION: Tubular rim-enhancing collection within the expected course of the right parotid duct, most compatible with sialodochitis. Evaluation for obstructing radiopaque stones within the course of the right parotid duct is extremely limited secondary to streak and beam hardening artifact generated by dental hardware/amalgam. Associated adjacent cellulitis of the right side of the face.    Right sided parotiditis.    Enlarged right-sided cervical lymph nodes which are reactive.    --- End of Report ---        < end of copied text >        Imaging studies(films) were independently reviewed.Findings as detailed in report above

## 2021-09-02 NOTE — H&P ADULT - NSHPADDITIONALINFOADULT_GEN_ALL_CORE
discussed with patient in detail, expresses understanding of treatment plans.  discussed with ID attending

## 2021-09-02 NOTE — H&P ADULT - HISTORY OF PRESENT ILLNESS
69 yo female with PMH of HTN, HLD, PAD s/p Left BKA p/w right facial swelling.  Patient reports that she has been having dental work over the past few weeks.  Everything had been going well up until last week.  Patient was seen by her dentist again last Tuesday, two days later she noticed right facial swelling.  Went back to her dentist who sent her to an oral surgeon yesterday.  Oral surgeon recommended patient come to the Emergency Department.  Patient was started on Amoxicillin prior to procedure and completed course last week without improvement

## 2021-09-02 NOTE — ED CDU PROVIDER SUBSEQUENT DAY NOTE - HISTORY
No interval changes since initial CDU provider note. without complaint. NAD. Plan to continue antibiotics in the setting of sialodochitis/cellulitis. ENT following.  - REY Ag

## 2021-09-02 NOTE — PROGRESS NOTE ADULT - ASSESSMENT
71 yo female w extensive R parotitis w adjacent soft tissue swelling and cellulitis. Pt slightly improved since yesterday after overnight of IV unasyn and decadron, however, firm swelling persists. Unable to express pus from duct

## 2021-09-02 NOTE — ED CDU PROVIDER SUBSEQUENT DAY NOTE - ATTENDING CONTRIBUTION TO CARE
Attending Bulmaro: I have personally performed a face to face diagnostic evaluation on this patient.  I have reviewed the ACP note and agree with the history, exam, and plan of care, except as noted.   My medical decision making and observations are found above.

## 2021-09-02 NOTE — PROGRESS NOTE ADULT - SUBJECTIVE AND OBJECTIVE BOX
ENT ISSUE/POD: R parotitis and facial cellulitis    HPI: 69 yo F a R parotitis and adjacent facial cellulitis and soft tissue edema after dental work now after one night in CDU for IV unasyn and decadron. Pt reports feeling slightly improved, however, swelling and pain remains. WBC 16.37 this am         PAST MEDICAL & SURGICAL HISTORY:  Mild HTN    HLD (hyperlipidemia)    S/P below knee amputation, left      Allergies    No Known Allergies    Intolerances      MEDICATIONS  (STANDING):  ampicillin/sulbactam  IVPB 3 Gram(s) IV Intermittent every 6 hours  atorvastatin 40 milliGRAM(s) Oral at bedtime  lactated ringers. 1000 milliLiter(s) (125 mL/Hr) IV Continuous <Continuous>  NIFEdipine XL 60 milliGRAM(s) Oral daily    MEDICATIONS  (PRN):  morphine  - Injectable 2 milliGRAM(s) IV Push every 6 hours PRN Severe Pain (7 - 10)      ROS:   ENT: all negative except as noted in HPI   Pulm: denies SOB, cough, hemoptysis  Neuro: denies numbness/tingling, loss of sensation  Endo: denies heat/cold intolerance, excessive sweating      Vital Signs Last 24 Hrs  T(C): 36.7 (02 Sep 2021 04:12), Max: 36.9 (01 Sep 2021 12:25)  T(F): 98 (02 Sep 2021 04:12), Max: 98.4 (01 Sep 2021 12:25)  HR: 75 (02 Sep 2021 04:12) (70 - 90)  BP: 139/65 (02 Sep 2021 04:12) (114/61 - 155/64)  BP(mean): 96 (02 Sep 2021 00:35) (78 - 96)  RR: 17 (02 Sep 2021 04:12) (16 - 20)  SpO2: 95% (02 Sep 2021 04:12) (95% - 100%)                          10.6   16.37 )-----------( 240      ( 02 Sep 2021 06:28 )             33.7    09-02    139  |  103  |  13  ----------------------------<  180<H>  4.2   |  22  |  0.69    Ca    8.3<L>      02 Sep 2021 06:28    TPro  8.3  /  Alb  3.5  /  TBili  0.4  /  DBili  x   /  AST  50<H>  /  ALT  24  /  AlkPhos  82  09-01       PHYSICAL EXAM:  Gen: NAD  Skin: No rashes, bruises, or lesions  Head: Normocephalic, Atraumatic  Face: R parotid firm swelling with extension to cheek, tender to deep palpation   Eyes: no scleral injection  Nose: Nares bilaterally patent, no discharge  Mouth: No Stridor / Drooling / Trismus.  Mucosa moist, tongue/uvula midline, unable to express pus from stensons duct with deep palpation   Neck: Flat, supple, no lymphadenopathy, trachea midline, no masses  Lymphatic: No lymphadenopathy  Resp: breathing easily, no stridor  Neuro: facial nerve intact, no facial droop

## 2021-09-02 NOTE — H&P ADULT - NSHPREVIEWOFSYSTEMS_GEN_ALL_CORE
Gen: no loss of wt + loss of appetite  see above HPI   ENT: no dizziness no hearing loss  Ophth: no blurring of vision no loss of vision  Resp: No cough no sputum production  CVS: No chest pain no palpitations no orthopnea  GI: no nausea, vomiting or diarrhea   : no dysuria, hematuria  Endo: no polyuria no excessive sweating  Neuro: no weakness no paresthesias  Heme: No petechiae no easy bruising  Msk: No joint pain no swelling  Skin: No rash no itching

## 2021-09-02 NOTE — ED ADULT NURSE REASSESSMENT NOTE - NS ED NURSE REASSESS COMMENT FT1
12.15 Received the Pt from  CHELA schultz t is Observed for Rt facial swelling/Sialodochitis . Received the Pt A&OX 4 obeys commands Jennifer N/V/D fever chills cp SOB   Comfort care & safety measures continued  IV site looks clean & dry no signs of infiltration noted pt denies  pain IV site .  Pt is advised to call for help  call bell with in the reach pt verbalized the understanding . Pt states  she has pain in Rt face 4/10 Pt has received Toradol 15mg IVP   pending CDU  MD sewell . GCS 15/15 A&OX 4 PERRLA  size 3 Strong upper & lower extremities steady gait   No facial droop  No Hand Leg drop denies numbness tingling Continue to monitor
ENT PA at bedside evaluating pt.
Pt reports 3 episodes of blurry vision and breathing heavily after she got the Lovenox injection. States "10-15 minutes after the injection, I was watching TV and the screen is blurry, and I was breathing heavily", states "I took a deep breathe and felt better after". Denies chest pain. Dr. Gayle made aware. States to keep an eye on her and he will send the NP to take a look at her. Will continue to reassess patient.
Pt received from CHELA Laguna. Pt oriented to CDU & plan of care was discussed. Pt states she feels better than previously and states R facial swelling is decreased. Pt denies any difficulty breathing or swallowing but states she has difficulty opening her mouth and masticating the food. Pt aware to notify RN or PA for any difficulty breathing or swallowing. Pt endorses minimal discomfort to cheek. Pt encouraged to use hot packs. Safety & comfort measures maintained. Call bell in reach. Will continue to monitor.

## 2021-09-02 NOTE — ED CDU PROVIDER SUBSEQUENT DAY NOTE - MEDICAL DECISION MAKING DETAILS
Attending Nello: 69 y/o F w/ PMH as documented initially presented to the ED w/ R facial swelling. CT scan showing R sided sialodochitis, facial cellulitis, and parotidis. Attending Bulmaro: 69 y/o F w/ PMH as documented initially presented to the ED w/ R facial swelling. CT scan showing R sided sialodochitis, facial cellulitis, and parotidis. Sent to CDU for continued monitoring, vital sign monitoring, IV abx, ENT re-eval, hydration. Will reassess the need for additional interventions as clinically warranted.

## 2021-09-02 NOTE — CONSULT NOTE ADULT - ASSESSMENT
69 yo female w recent dental work presenting w R facial swelling, found to have R parotid sialodochitis and adjacent facial/soft tissue cellulitis and reactive cervical adenopathy 
71 yo female with PMH of HTN, HLD, PAD s/p Left BKA p/w right facial swelling.  Patient reports that she has been having dental work over the past few weeks.  Everything had been going well up until last week.  Patient was seen by her dentist again last Tuesday, two days later she noticed right facial swelling.  Went back to her dentist who sent her to an oral surgeon yesterday.  Oral surgeon recommended patient come to the Emergency Department.   R parotitis  ? sialadenitis  ? abscess vs swelling with inspisated secretions  Leucocytosis though also on steroids  Overall she feels better since admission  Rec:  A) Parotitis/sialadenitis with ?? abscess vs secretions  follow clinically  follow Cx  may need drainage vs marsupialization of stone  Check MRSA PCR  For now continue unasyn-if no improvement will add empiric vanco  ENT follow up    b) leucocytosis  likely from above + steroids  monitor and trend  plana s above    Will tailor plan for ID issues  per course,results.Will defer to primary team on management of other issues.  Assessment, plan and recommendations as detailed above were discussed with the medical/primary  team and ENT Dr Lee.  Will Follow.  Beeper [phone] [facility] [id number].   Wknd/afterhours/No response-[phone] or Fellow on call

## 2021-09-02 NOTE — ED CDU PROVIDER SUBSEQUENT DAY NOTE - CPE EDP CARDIAC NORM
[facility] Cardiology Progress Note - Jazmine Mckinnon Patient Status:  Inpatient    1939 MRN [id number]   [facility] [address] Attending Ar Short, [address] [place] [address] Day # 1 PCP Boni Lockwood MD     Subjective:  No long-term current use of insulin (HCC)     Anemia     TR (tricuspid regurgitation)     Gross hematuria     Anticoagulated on Coumadin     Mechanical heart valve present     Atrial fibrillation, chronic (HCC)      Objective:   Temp: 98.4 °F (36.9 °C)  Pulse normal... 05/05/21  0633 05/06/21  0603    138 139 137 140   K 4.9 4.6 4.4 4.4 4.2    107 111 112 113*   CO2 25.0 26.0 23.0 20.0* 20.0*   BUN 67* 59* 33* 32* 39*   CREATSERUM 2.28* 1.96* 1.21 1.18 1.77*   CA 9.8 9.4 8.8 9.2 9.1   MG  --  2.5  --   --   - (PREVALITE) powder packet 8 g, 8 g, Oral, Nightly  ferrous sulfate EC tab 325 mg, 325 mg, Oral, TID CC  finasteride (PROSCAR) tab 5 mg, 5 mg, Oral, Daily  Fluticasone Propionate (FLONASE) 50 MCG/ACT nasal spray 2 spray, 2 spray, Each Nare, Daily  Minocycli of diabetes  Allergy: see above drug allergies

## 2021-09-03 LAB
A1C WITH ESTIMATED AVERAGE GLUCOSE RESULT: 6.8 % — HIGH (ref 4–5.6)
ANION GAP SERPL CALC-SCNC: 13 MMOL/L — SIGNIFICANT CHANGE UP (ref 5–17)
BUN SERPL-MCNC: 18 MG/DL — SIGNIFICANT CHANGE UP (ref 7–23)
CALCIUM SERPL-MCNC: 9.2 MG/DL — SIGNIFICANT CHANGE UP (ref 8.4–10.5)
CHLORIDE SERPL-SCNC: 102 MMOL/L — SIGNIFICANT CHANGE UP (ref 96–108)
CO2 SERPL-SCNC: 24 MMOL/L — SIGNIFICANT CHANGE UP (ref 22–31)
COVID-19 SPIKE DOMAIN AB INTERP: POSITIVE
COVID-19 SPIKE DOMAIN ANTIBODY RESULT: >250 U/ML — HIGH
CREAT SERPL-MCNC: 0.7 MG/DL — SIGNIFICANT CHANGE UP (ref 0.5–1.3)
ESTIMATED AVERAGE GLUCOSE: 148 MG/DL — HIGH (ref 68–114)
GLUCOSE SERPL-MCNC: 193 MG/DL — HIGH (ref 70–99)
GRAM STN FLD: SIGNIFICANT CHANGE UP
HCT VFR BLD CALC: 35.4 % — SIGNIFICANT CHANGE UP (ref 34.5–45)
HCV AB S/CO SERPL IA: 0.17 S/CO — SIGNIFICANT CHANGE UP (ref 0–0.99)
HCV AB SERPL-IMP: SIGNIFICANT CHANGE UP
HGB BLD-MCNC: 11 G/DL — LOW (ref 11.5–15.5)
MCHC RBC-ENTMCNC: 26.4 PG — LOW (ref 27–34)
MCHC RBC-ENTMCNC: 31.1 GM/DL — LOW (ref 32–36)
MCV RBC AUTO: 84.9 FL — SIGNIFICANT CHANGE UP (ref 80–100)
NRBC # BLD: 0 /100 WBCS — SIGNIFICANT CHANGE UP (ref 0–0)
PLATELET # BLD AUTO: 271 K/UL — SIGNIFICANT CHANGE UP (ref 150–400)
POTASSIUM SERPL-MCNC: 4.3 MMOL/L — SIGNIFICANT CHANGE UP (ref 3.5–5.3)
POTASSIUM SERPL-SCNC: 4.3 MMOL/L — SIGNIFICANT CHANGE UP (ref 3.5–5.3)
RBC # BLD: 4.17 M/UL — SIGNIFICANT CHANGE UP (ref 3.8–5.2)
RBC # FLD: 15.3 % — HIGH (ref 10.3–14.5)
SARS-COV-2 IGG+IGM SERPL QL IA: >250 U/ML — HIGH
SARS-COV-2 IGG+IGM SERPL QL IA: POSITIVE
SODIUM SERPL-SCNC: 139 MMOL/L — SIGNIFICANT CHANGE UP (ref 135–145)
SPECIMEN SOURCE: SIGNIFICANT CHANGE UP
TSH SERPL-MCNC: 0.24 UIU/ML — LOW (ref 0.27–4.2)
WBC # BLD: 18.42 K/UL — HIGH (ref 3.8–10.5)
WBC # FLD AUTO: 18.42 K/UL — HIGH (ref 3.8–10.5)

## 2021-09-03 PROCEDURE — 99232 SBSQ HOSP IP/OBS MODERATE 35: CPT

## 2021-09-03 RX ORDER — DEXAMETHASONE 0.5 MG/5ML
10 ELIXIR ORAL EVERY 8 HOURS
Refills: 0 | Status: COMPLETED | OUTPATIENT
Start: 2021-09-03 | End: 2021-09-04

## 2021-09-03 RX ORDER — DEXAMETHASONE 0.5 MG/5ML
10 ELIXIR ORAL EVERY 8 HOURS
Refills: 0 | Status: DISCONTINUED | OUTPATIENT
Start: 2021-09-03 | End: 2021-09-03

## 2021-09-03 RX ADMIN — AMPICILLIN SODIUM AND SULBACTAM SODIUM 200 GRAM(S): 250; 125 INJECTION, POWDER, FOR SUSPENSION INTRAMUSCULAR; INTRAVENOUS at 21:37

## 2021-09-03 RX ADMIN — Medication 60 MILLIGRAM(S): at 05:29

## 2021-09-03 RX ADMIN — ATORVASTATIN CALCIUM 40 MILLIGRAM(S): 80 TABLET, FILM COATED ORAL at 21:38

## 2021-09-03 RX ADMIN — Medication 500 MILLIGRAM(S): at 12:11

## 2021-09-03 RX ADMIN — ENOXAPARIN SODIUM 40 MILLIGRAM(S): 100 INJECTION SUBCUTANEOUS at 12:10

## 2021-09-03 RX ADMIN — Medication 102 MILLIGRAM(S): at 21:37

## 2021-09-03 RX ADMIN — Medication 1000 UNIT(S): at 12:11

## 2021-09-03 RX ADMIN — AMPICILLIN SODIUM AND SULBACTAM SODIUM 200 GRAM(S): 250; 125 INJECTION, POWDER, FOR SUSPENSION INTRAMUSCULAR; INTRAVENOUS at 03:28

## 2021-09-03 RX ADMIN — Medication 102 MILLIGRAM(S): at 05:28

## 2021-09-03 RX ADMIN — Medication 1 TABLET(S): at 12:11

## 2021-09-03 RX ADMIN — AMPICILLIN SODIUM AND SULBACTAM SODIUM 200 GRAM(S): 250; 125 INJECTION, POWDER, FOR SUSPENSION INTRAMUSCULAR; INTRAVENOUS at 09:23

## 2021-09-03 RX ADMIN — AMPICILLIN SODIUM AND SULBACTAM SODIUM 200 GRAM(S): 250; 125 INJECTION, POWDER, FOR SUSPENSION INTRAMUSCULAR; INTRAVENOUS at 15:24

## 2021-09-03 RX ADMIN — LORATADINE 10 MILLIGRAM(S): 10 TABLET ORAL at 12:11

## 2021-09-03 NOTE — PROGRESS NOTE ADULT - SUBJECTIVE AND OBJECTIVE BOX
ENT ISSUE/POD: R parotitis and facial cellulitis      HPI: 71 yo F a R parotitis and adjacent facial cellulitis and soft tissue edema after dental work now after one night in CDU for IV unasyn and decadron. Pt reports feeling slightly improved, however, swelling and pain remains. WBC 18.42 this am           PAST MEDICAL & SURGICAL HISTORY:  Mild HTN    HLD (hyperlipidemia)    S/P below knee amputation, left    H/O total hip arthroplasty, left      Allergies    No Known Allergies    Intolerances      MEDICATIONS  (STANDING):  ampicillin/sulbactam  IVPB 3 Gram(s) IV Intermittent every 6 hours  ascorbic acid 500 milliGRAM(s) Oral daily  atorvastatin 40 milliGRAM(s) Oral at bedtime  calcium carbonate 1250 mG  + Vitamin D (OsCal 500 + D) 1 Tablet(s) Oral daily  cholecalciferol 1000 Unit(s) Oral daily  enoxaparin Injectable 40 milliGRAM(s) SubCutaneous daily  influenza   Vaccine 0.5 milliLiter(s) IntraMuscular once  loratadine 10 milliGRAM(s) Oral daily  NIFEdipine XL 60 milliGRAM(s) Oral daily    MEDICATIONS  (PRN):  famotidine    Tablet 20 milliGRAM(s) Oral two times a day PRN dyspepsia  morphine  - Injectable 2 milliGRAM(s) IV Push every 6 hours PRN Severe Pain (7 - 10)  senna 1 Tablet(s) Oral daily PRN Constipation      Social History: see consult    Family history: see consult    ROS:   ENT: all negative except as noted in HPI   Pulm: denies SOB, cough, hemoptysis  Neuro: denies numbness/tingling, loss of sensation  Endo: denies heat/cold intolerance, excessive sweating      Vital Signs Last 24 Hrs  T(C): 37 (03 Sep 2021 05:43), Max: 37 (02 Sep 2021 20:00)  T(F): 98.6 (03 Sep 2021 05:43), Max: 98.6 (02 Sep 2021 20:00)  HR: 70 (03 Sep 2021 05:43) (70 - 83)  BP: 127/68 (03 Sep 2021 05:43) (117/68 - 137/64)  BP(mean): --  RR: 18 (03 Sep 2021 05:43) (18 - 19)  SpO2: 97% (03 Sep 2021 05:43) (96% - 97%)                          11.0   18.42 )-----------( 271      ( 03 Sep 2021 06:59 )             35.4    09-03    139  |  102  |  18  ----------------------------<  193<H>  4.3   |  24  |  0.70    Ca    9.2      03 Sep 2021 06:59    TPro  8.3  /  Alb  3.5  /  TBili  0.4  /  DBili  x   /  AST  50<H>  /  ALT  24  /  AlkPhos  82  09-01       PHYSICAL EXAM:  Gen: NAD  Skin: No rashes, bruises, or lesions  Head: Normocephalic, Atraumatic  Face: R parotid firm swelling with extension to cheek, tender to deep palpation   Eyes: no scleral injection  Nose: Nares bilaterally patent, no discharge  Mouth: No Stridor / Drooling / Trismus.  Mucosa moist, tongue/uvula midline, brown murky fluid expressed from stensons duct with deep palpation, cultures sent  Neck: Flat, supple, no lymphadenopathy, trachea midline, no masses  Lymphatic: No lymphadenopathy  Resp: breathing easily, no stridor  Neuro: facial nerve intact, no facial droop

## 2021-09-03 NOTE — PHYSICAL THERAPY INITIAL EVALUATION ADULT - ADDITIONAL COMMENTS
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

## 2021-09-03 NOTE — PHYSICAL THERAPY INITIAL EVALUATION ADULT - PERTINENT HX OF CURRENT PROBLEM, REHAB EVAL
Pt is a 69 yo female admitted to [facility] on 9/2/21 with PMH of HTN, HLD, PAD s/p Left BKA p/w right facial swelling.  Pt reports that she has been having dental work over the past few weeks.  Pt was seen by her dentist again last Tuesday, two days later she noticed right facial swelling.

## 2021-09-03 NOTE — PHYSICAL THERAPY INITIAL EVALUATION ADULT - GENERAL OBSERVATIONS, REHAB EVAL
Pt received sitting EOB, +LLE prosthetic donned prior to PT arrival (pt completed independently), +IVL. Pt is A&Ox4, pleasant and cooperative.

## 2021-09-03 NOTE — PROGRESS NOTE ADULT - ASSESSMENT
69 yo female with PMH of HTN, HLD, PAD s/p Left BKA p/w right facial swelling diagnosed with left parotitis and adjacent cellulitis of the face

## 2021-09-03 NOTE — PROGRESS NOTE ADULT - SUBJECTIVE AND OBJECTIVE BOX
Patient is a 70y old  Female who presents with a chief complaint of right facial swelling (03 Sep 2021 07:45)    Being followed by ID for R facial cellulitis, parotitis    Interval history:feels much better   decreased pain   No acute events      ROS:  No cough,SOB,CP  No N/V/D./abd pain  No other complaints      Antimicrobials:    ampicillin/sulbactam  IVPB 3 Gram(s) IV Intermittent every 6 hours    Other medications reviewed  MEDICATIONS  (STANDING):  ampicillin/sulbactam  IVPB 3 Gram(s) IV Intermittent every 6 hours  ascorbic acid 500 milliGRAM(s) Oral daily  atorvastatin 40 milliGRAM(s) Oral at bedtime  calcium carbonate 1250 mG  + Vitamin D (OsCal 500 + D) 1 Tablet(s) Oral daily  cholecalciferol 1000 Unit(s) Oral daily  enoxaparin Injectable 40 milliGRAM(s) SubCutaneous daily  influenza   Vaccine 0.5 milliLiter(s) IntraMuscular once  loratadine 10 milliGRAM(s) Oral daily  NIFEdipine XL 60 milliGRAM(s) Oral daily      Vital Signs Last 24 Hrs  T(C): 37 (09-03-21 @ 05:43), Max: 37 (09-02-21 @ 20:00)  T(F): 98.6 (09-03-21 @ 05:43), Max: 98.6 (09-02-21 @ 20:00)  HR: 70 (09-03-21 @ 05:43) (70 - 83)  BP: 127/68 (09-03-21 @ 05:43) (117/68 - 137/64)  BP(mean): --  RR: 18 (09-03-21 @ 05:43) (18 - 19)  SpO2: 97% (09-03-21 @ 05:43) (96% - 97%)    Physical Exam:        HEENT PERRLA EOMI  R facial and parotid area swelling -slight decrease  Mild tenderness    No oral exudate or erythema    Chest Good AE,CTA    CVS RRR S1 S2 WNl No murmur or rub or gallop    Abd soft BS normal No tenderness no masses    R leg BKA no tenderness or discharge     IV site no erythema tenderness or discharge    CNS AAO X 3 no focal    Lab Data:                          11.0   18.42 )-----------( 271      ( 03 Sep 2021 06:59 )             35.4     WBC Count: 18.42 (09-03-21 @ 06:59)  WBC Count: 16.37 (09-02-21 @ 06:28)  WBC Count: 14.19 (09-01-21 @ 08:05)    09-03    139  |  102  |  18  ----------------------------<  193<H>  4.3   |  24  |  0.70    Ca    9.2      03 Sep 2021 06:59          Culture - Blood (collected 01 Sep 2021 13:22)  Source: .Blood Blood-Venous  Preliminary Report (02 Sep 2021 14:02):    No growth to date.    Culture - Blood (collected 01 Sep 2021 11:14)  Source: .Blood Blood-Venous  Preliminary Report (02 Sep 2021 12:01):    No growth to date.      < from: CT Maxillofacial w/ IV Cont (09.01.21 @ 09:14) >  IMPRESSION: Tubular rim-enhancing collection within the expected course of the right parotid duct, most compatible with sialodochitis. Evaluation for obstructing radiopaque stones within the course of the right parotid duct is extremely limited secondary to streak and beam hardening artifact generated by dental hardware/amalgam. Associated adjacent cellulitis of the right side of the face.    Right sided parotiditis.    Enlarged right-sided cervical lymph nodes which are reactive.      < end of copied text >

## 2021-09-03 NOTE — PHYSICAL THERAPY INITIAL EVALUATION ADULT - DISCHARGE DISPOSITION, PT EVAL
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

## 2021-09-03 NOTE — PHYSICAL THERAPY INITIAL EVALUATION ADULT - PRECAUTIONS/LIMITATIONS, REHAB EVAL
Went back to her dentist who sent her to an oral surgeon yesterday.  Oral surgeon recommended pt come to the ED.  +started on Amoxicillin prior to procedure and completed course last week without improvement. Hospital course: CT max face shows R parotitis (full report below). ENT consulted for further recs. +found to have R parotid sialodochitis and adjacent facial/soft tissue cellulitis and reactive cervical adenopathy, Admit to medicine for additional IV abx and steroid tx, Warm compress to R face at least 4 times daily Went back to her dentist who sent her to an oral surgeon yesterday.  Oral surgeon recommended pt come to the ED.  +started on Amoxicillin prior to procedure and completed course last week without improvement. Hospital course: CT max face shows R parotitis (full report below). ENT consulted for further recs. +found to have R parotid sialodochitis and adjacent facial/soft tissue cellulitis and reactive cervical adenopathy, Admit to medicine for additional IV abx and steroid tx, Warm compress to R face at least 4 times daily/fall precautions

## 2021-09-03 NOTE — PROGRESS NOTE ADULT - SUBJECTIVE AND OBJECTIVE BOX
Patient is a 70y old  Female who presents with a chief complaint of right facial swelling (03 Sep 2021 08:38)      DATE OF SERVICE: 09-03-21 @ 18:17    SUBJECTIVE / OVERNIGHT EVENTS: overnight events noted  "I feel much better"    ROS:  Resp: No cough no sputum production  CVS: No chest pain no palpitations no orthopnea  GI: no N/V/D  : no dysuria, no hematuria  Neuro: no weakness no paresthesias        MEDICATIONS  (STANDING):  ampicillin/sulbactam  IVPB 3 Gram(s) IV Intermittent every 6 hours  ascorbic acid 500 milliGRAM(s) Oral daily  atorvastatin 40 milliGRAM(s) Oral at bedtime  calcium carbonate 1250 mG  + Vitamin D (OsCal 500 + D) 1 Tablet(s) Oral daily  cholecalciferol 1000 Unit(s) Oral daily  enoxaparin Injectable 40 milliGRAM(s) SubCutaneous daily  influenza   Vaccine 0.5 milliLiter(s) IntraMuscular once  loratadine 10 milliGRAM(s) Oral daily  NIFEdipine XL 60 milliGRAM(s) Oral daily    MEDICATIONS  (PRN):  famotidine    Tablet 20 milliGRAM(s) Oral two times a day PRN dyspepsia  morphine  - Injectable 2 milliGRAM(s) IV Push every 6 hours PRN Severe Pain (7 - 10)  senna 1 Tablet(s) Oral daily PRN Constipation        CAPILLARY BLOOD GLUCOSE        I&O's Summary    02 Sep 2021 07:01  -  03 Sep 2021 07:00  --------------------------------------------------------  IN: 360 mL / OUT: 700 mL / NET: -340 mL    03 Sep 2021 07:01  -  03 Sep 2021 18:17  --------------------------------------------------------  IN: 500 mL / OUT: 800 mL / NET: -300 mL        Vital Signs Last 24 Hrs  T(C): 36.8 (03 Sep 2021 16:07), Max: 37 (02 Sep 2021 20:00)  T(F): 98.3 (03 Sep 2021 16:07), Max: 98.6 (02 Sep 2021 20:00)  HR: 74 (03 Sep 2021 16:07) (70 - 76)  BP: 158/75 (03 Sep 2021 16:07) (117/68 - 158/75)  BP(mean): --  RR: 18 (03 Sep 2021 16:07) (18 - 18)  SpO2: 96% (03 Sep 2021 16:07) (96% - 97%)    PHYSICAL EXAM:   HEENT: ARACELIS EOMI  right facial firm swelling but much improved   Neck: Supple, no JVD  Lungs: no wheeze, no crackles  CVS: S1 S2 no M/R/G  Abdomen: no tenderness, BS present  Neuro: AO x 3 non focal   Psych: appropriate affect  Skin: warm, dry  Ext: no edema right leg  sk: no joint swelling or deformities  Back: no CVA tenderness,    LABS:                        11.0   18.42 )-----------( 271      ( 03 Sep 2021 06:59 )             35.4     09-03    139  |  102  |  18  ----------------------------<  193<H>  4.3   |  24  |  0.70    Ca    9.2      03 Sep 2021 06:59                  All consultant(s) notes reviewed and care discussed with other providers        Contact Number, Dr Gayle [phone]

## 2021-09-03 NOTE — PROGRESS NOTE ADULT - ASSESSMENT
69 yo female w extensive R parotitis w adjacent soft tissue swelling and cellulitis. Pt continues to improve clinically, currently on IV unasyn, swelling decreased significantly. Brown murky fluid expressed from Stensen's duct. WBC 18.42 up from 16.37 yesterday.

## 2021-09-03 NOTE — PROGRESS NOTE ADULT - ASSESSMENT
71 yo female with PMH of HTN, HLD, PAD s/p Left BKA p/w right facial swelling.  Patient reports that she has been having dental work over the past few weeks.  Everything had been going well up until last week.  Patient was seen by her dentist again last Tuesday, two days later she noticed right facial swelling.  Went back to her dentist who sent her to an oral surgeon yesterday.  Oral surgeon recommended patient come to the Emergency Department.   R parotitis  ? sialadenitis  ? abscess vs swelling with inspisated secretions  Leucocytosis though also on steroids  Overall she feels better since admission  swelling appears decreased     Rec:  A) Parotitis/sialadenitis with ?? abscess vs secretions  follow clinically  follow Cx  may need drainage vs marsupialization of stone  Check MRSA PCR  For now continue unasyn-if no improvement will add empiric vanco  ENT follow up    b) leucocytosis  likely from above + steroids  monitor and trend  plana s above    Will tailor plan for ID issues  per course,results.Will defer to primary team on management of other issues.  Assessment, plan and recommendations as detailed above were discussed with the medical/primary  team   Infectious Diseases Service will cover over weekend.  Please call [phone] if issues

## 2021-09-03 NOTE — PHYSICAL THERAPY INITIAL EVALUATION ADULT - ACTIVE RANGE OF MOTION EXAMINATION, REHAB EVAL
BUE and RLE WFL, LLE BKA WFL, +Prosthetic/Left UE Active ROM was WFL (within functional limits)/Right UE Active ROM was WFL (within functional limits)/Left LE Active ROM was WFL (within functional limits)/Right LE Active ROM was WFL (within functional limits)

## 2021-09-04 LAB
-  AMIKACIN: SIGNIFICANT CHANGE UP
-  AMOXICILLIN/CLAVULANIC ACID: SIGNIFICANT CHANGE UP
-  AMPICILLIN/SULBACTAM: SIGNIFICANT CHANGE UP
-  AMPICILLIN: SIGNIFICANT CHANGE UP
-  AMPICILLIN: SIGNIFICANT CHANGE UP
-  AZTREONAM: SIGNIFICANT CHANGE UP
-  CEFAZOLIN: SIGNIFICANT CHANGE UP
-  CEFEPIME: SIGNIFICANT CHANGE UP
-  CEFOXITIN: SIGNIFICANT CHANGE UP
-  CEFTRIAXONE: SIGNIFICANT CHANGE UP
-  CIPROFLOXACIN: SIGNIFICANT CHANGE UP
-  ERTAPENEM: SIGNIFICANT CHANGE UP
-  GENTAMICIN: SIGNIFICANT CHANGE UP
-  IMIPENEM: SIGNIFICANT CHANGE UP
-  LEVOFLOXACIN: SIGNIFICANT CHANGE UP
-  MEROPENEM: SIGNIFICANT CHANGE UP
-  PIPERACILLIN/TAZOBACTAM: SIGNIFICANT CHANGE UP
-  TETRACYCLINE: SIGNIFICANT CHANGE UP
-  TOBRAMYCIN: SIGNIFICANT CHANGE UP
-  TRIMETHOPRIM/SULFAMETHOXAZOLE: SIGNIFICANT CHANGE UP
-  VANCOMYCIN: SIGNIFICANT CHANGE UP
METHOD TYPE: SIGNIFICANT CHANGE UP
METHOD TYPE: SIGNIFICANT CHANGE UP

## 2021-09-04 PROCEDURE — 99232 SBSQ HOSP IP/OBS MODERATE 35: CPT

## 2021-09-04 PROCEDURE — 99233 SBSQ HOSP IP/OBS HIGH 50: CPT

## 2021-09-04 RX ORDER — ERTAPENEM SODIUM 1 G/1
INJECTION, POWDER, LYOPHILIZED, FOR SOLUTION INTRAMUSCULAR; INTRAVENOUS
Refills: 0 | Status: DISCONTINUED | OUTPATIENT
Start: 2021-09-04 | End: 2021-09-08

## 2021-09-04 RX ORDER — ERTAPENEM SODIUM 1 G/1
1000 INJECTION, POWDER, LYOPHILIZED, FOR SOLUTION INTRAMUSCULAR; INTRAVENOUS EVERY 24 HOURS
Refills: 0 | Status: DISCONTINUED | OUTPATIENT
Start: 2021-09-05 | End: 2021-09-08

## 2021-09-04 RX ORDER — ERTAPENEM SODIUM 1 G/1
1000 INJECTION, POWDER, LYOPHILIZED, FOR SOLUTION INTRAMUSCULAR; INTRAVENOUS ONCE
Refills: 0 | Status: COMPLETED | OUTPATIENT
Start: 2021-09-04 | End: 2021-09-04

## 2021-09-04 RX ADMIN — LORATADINE 10 MILLIGRAM(S): 10 TABLET ORAL at 11:08

## 2021-09-04 RX ADMIN — ENOXAPARIN SODIUM 40 MILLIGRAM(S): 100 INJECTION SUBCUTANEOUS at 11:09

## 2021-09-04 RX ADMIN — Medication 102 MILLIGRAM(S): at 05:32

## 2021-09-04 RX ADMIN — ATORVASTATIN CALCIUM 40 MILLIGRAM(S): 80 TABLET, FILM COATED ORAL at 22:24

## 2021-09-04 RX ADMIN — AMPICILLIN SODIUM AND SULBACTAM SODIUM 200 GRAM(S): 250; 125 INJECTION, POWDER, FOR SUSPENSION INTRAMUSCULAR; INTRAVENOUS at 07:52

## 2021-09-04 RX ADMIN — AMPICILLIN SODIUM AND SULBACTAM SODIUM 200 GRAM(S): 250; 125 INJECTION, POWDER, FOR SUSPENSION INTRAMUSCULAR; INTRAVENOUS at 02:17

## 2021-09-04 RX ADMIN — ERTAPENEM SODIUM 1000 MILLIGRAM(S): 1 INJECTION, POWDER, LYOPHILIZED, FOR SOLUTION INTRAMUSCULAR; INTRAVENOUS at 16:16

## 2021-09-04 RX ADMIN — Medication 1000 UNIT(S): at 11:08

## 2021-09-04 RX ADMIN — Medication 500 MILLIGRAM(S): at 11:08

## 2021-09-04 RX ADMIN — Medication 1 TABLET(S): at 11:08

## 2021-09-04 RX ADMIN — Medication 60 MILLIGRAM(S): at 05:33

## 2021-09-04 NOTE — DIETITIAN INITIAL EVALUATION ADULT. - FACTORS AFF FOOD INTAKE
difficulty chewing Pt reports she is now fully able to open her mouth and can chew mechanical soft/puree foods on one side of her mouth./difficulty chewing

## 2021-09-04 NOTE — DIETITIAN INITIAL EVALUATION ADULT. - PERTINENT LABORATORY DATA
Hemoglobin : 11.0 g/dL  Hematocrit : 35.4 %  A1C with Estimated Average Glucose Result: 6.8 % (21 @ 12:34)    POCT B, 180 (); 193 (9/3)

## 2021-09-04 NOTE — PROGRESS NOTE ADULT - SUBJECTIVE AND OBJECTIVE BOX
Patient is a 70y old  Female who presents with a chief complaint of right facial swelling (04 Sep 2021 11:18)    Being followed by ID for R parotitis    Interval history:feels better  pain decreased   No acute events      ROS:  No cough,SOB,CP  No N/V/D./abd pain  No other complaints      Antimicrobials:    ampicillin/sulbactam  IVPB 3 Gram(s) IV Intermittent every 6 hours    Other medications reviewed  MEDICATIONS  (STANDING):  ampicillin/sulbactam  IVPB 3 Gram(s) IV Intermittent every 6 hours  ascorbic acid 500 milliGRAM(s) Oral daily  atorvastatin 40 milliGRAM(s) Oral at bedtime  calcium carbonate 1250 mG  + Vitamin D (OsCal 500 + D) 1 Tablet(s) Oral daily  cholecalciferol 1000 Unit(s) Oral daily  enoxaparin Injectable 40 milliGRAM(s) SubCutaneous daily  influenza   Vaccine 0.5 milliLiter(s) IntraMuscular once  loratadine 10 milliGRAM(s) Oral daily  NIFEdipine XL 60 milliGRAM(s) Oral daily      Vital Signs Last 24 Hrs  T(C): 36.8 (09-04-21 @ 08:42), Max: 36.8 (09-03-21 @ 16:07)  T(F): 98.3 (09-04-21 @ 08:42), Max: 98.3 (09-03-21 @ 16:07)  HR: 74 (09-04-21 @ 08:42) (66 - 74)  BP: 135/68 (09-04-21 @ 08:42) (135/68 - 158/75)  BP(mean): --  RR: 18 (09-04-21 @ 08:42) (18 - 18)  SpO2: 95% (09-04-21 @ 08:42) (95% - 96%)    Physical Exam:      HEENT PERRLA EOMI  R facial and parotid area swelling -slight decrease  Mild tenderness    No oral exudate or erythema    Chest Good AE,CTA    CVS RRR S1 S2 WNl No murmur or rub or gallop    Abd soft BS normal No tenderness no masses    R leg BKA no tenderness or discharge     IV site no erythema tenderness or discharge    CNS AAO X 3 no focal  Lab Data:                          11.0   18.42 )-----------( 271      ( 03 Sep 2021 06:59 )             35.4     WBC Count: 18.42 (09-03-21 @ 06:59)  WBC Count: 16.37 (09-02-21 @ 06:28)  WBC Count: 14.19 (09-01-21 @ 08:05)    09-03    139  |  102  |  18  ----------------------------<  193<H>  4.3   |  24  |  0.70    Ca    9.2      03 Sep 2021 06:59          Culture - Body Fluid with Gram Stain (collected 03 Sep 2021 10:08)  Source: .Body Fluid right parotid gland  Gram Stain (03 Sep 2021 14:20):    Rare polymorphonuclear leukocytes seen per low power field    No organisms seen per oil power field    Culture - Abscess with Gram Stain (collected 02 Sep 2021 17:06)  Source: .Abscess R parotid  Preliminary Report (04 Sep 2021 09:48):    Rare Klebsiella pneumoniae ESBL    Rare Enterococcus faecalis    Rare Yeast "Susceptibilities not performed"    Few Alpha hemolytic strep "Susceptibilities not performed"    Insufficient growth-culture reincubated  Organism: Klebsiella pneumoniae ESBL (04 Sep 2021 09:36)  Organism: Klebsiella pneumoniae ESBL (04 Sep 2021 09:36)      -  Amikacin: S <=16      -  Amoxicillin/Clavulanic Acid: S <=8/4      -  Ampicillin: R >16 These ampicillin results predict results for amoxicillin      -  Ampicillin/Sulbactam: R >16/8 Enterobacter, Citrobacter, and Serratia may develop resistance during prolonged therapy (3-4 days)      -  Aztreonam: R >16      -  Cefazolin: R >16 Enterobacter, Citrobacter, and Serratia may develop resistance during prolonged therapy (3-4 days)      -  Cefepime: R >16      -  Cefoxitin: S <=8      -  Ceftriaxone: R >32 Enterobacter, Citrobacter, and Serratia may develop resistance during prolonged therapy      -  Ciprofloxacin: I 0.5      -  Ertapenem: S <=0.5      -  Gentamicin: R >8      -  Imipenem: S <=1      -  Levofloxacin: S <=0.5      -  Meropenem: S <=1      -  Piperacillin/Tazobactam: R <=8      -  Tobramycin: I 8      -  Trimethoprim/Sulfamethoxazole: R >2/38      Method Type: JOANN    Culture - Blood (collected 01 Sep 2021 13:22)  Source: .Blood Blood-Venous  Preliminary Report (02 Sep 2021 14:02):    No growth to date.    Culture - Blood (collected 01 Sep 2021 11:14)  Source: .Blood Blood-Venous  Preliminary Report (02 Sep 2021 12:01):    No growth to date.      < from: CT Maxillofacial w/ IV Cont (09.01.21 @ 09:14) >  IMPRESSION: Tubular rim-enhancing collection within the expected course of the right parotid duct, most compatible with sialodochitis. Evaluation for obstructing radiopaque stones within the course of the right parotid duct is extremely limited secondary to streak and beam hardening artifact generated by dental hardware/amalgam. Associated adjacent cellulitis of the right side of the face.    Right sided parotiditis.    Enlarged right-sided cervical lymph nodes which are reactive.    --- End of Report ---        < end of copied text >

## 2021-09-04 NOTE — PROGRESS NOTE ADULT - ASSESSMENT
71 yo female with PMH of HTN, HLD, PAD s/p Left BKA p/w right facial swelling diagnosed with left parotitis and adjacent cellulitis of the face

## 2021-09-04 NOTE — DIETITIAN INITIAL EVALUATION ADULT. - ORAL INTAKE PTA/DIET HISTORY
Diet History: [pt interview pending]  Pt with facial swelling, now improving; admitted with difficulty opening mouth, difficulty eating.  Allergies: NKFA  Home Nutrition Supplements: Ca 500 +D, vit C, vit D3, Rena, Zinc, Elderberry, Echinacea, Glucosamine & Chondroitin  Home DM2 Management: none- no h/o DM2 in EMR; HbA1c: 6.8% Diet History: Pt reports she eats chopped/pureed foods and smoothies for h/o dental problems. Pt avoids carbs (pasta, rice), relies on fruit smoothies; diet likely inadequate in protein.  Allergies: NKFA; pt reports lactose intolerance, avoids citrus, caffeine, and chocolate (for bladder issues)  Home Nutrition Supplements: Ca 500 +D, vit C, vit D3, Rena, Zinc, Elderberry, Echinacea, Glucosamine & Chondroitin  Home DM2 Management: none - no h/o DM2 in EMR; HbA1c: 6.8%

## 2021-09-04 NOTE — PROGRESS NOTE ADULT - SUBJECTIVE AND OBJECTIVE BOX
ENT ISSUE/POD: R parotitis and facial cellulitis      HPI: 69 yo F a R parotitis and adjacent facial cellulitis and soft tissue edema after dental work admitted for IV unasyn and decadron. Pt reports feeling improved with mild pain and swelling made better with hot packs. Pt tolerating PO, mostly soft foods      PAST MEDICAL & SURGICAL HISTORY:  Mild HTN    HLD (hyperlipidemia)    S/P below knee amputation, left    H/O total hip arthroplasty, left      Allergies    No Known Allergies    Intolerances      MEDICATIONS  (STANDING):  ampicillin/sulbactam  IVPB 3 Gram(s) IV Intermittent every 6 hours  ascorbic acid 500 milliGRAM(s) Oral daily  atorvastatin 40 milliGRAM(s) Oral at bedtime  calcium carbonate 1250 mG  + Vitamin D (OsCal 500 + D) 1 Tablet(s) Oral daily  cholecalciferol 1000 Unit(s) Oral daily  enoxaparin Injectable 40 milliGRAM(s) SubCutaneous daily  influenza   Vaccine 0.5 milliLiter(s) IntraMuscular once  loratadine 10 milliGRAM(s) Oral daily  NIFEdipine XL 60 milliGRAM(s) Oral daily    MEDICATIONS  (PRN):  famotidine    Tablet 20 milliGRAM(s) Oral two times a day PRN dyspepsia  morphine  - Injectable 2 milliGRAM(s) IV Push every 6 hours PRN Severe Pain (7 - 10)  senna 1 Tablet(s) Oral daily PRN Constipation      ROS:   ENT: all negative except as noted in HPI   Pulm: denies SOB, cough, hemoptysis  Neuro: denies numbness/tingling, loss of sensation  Endo: denies heat/cold intolerance, excessive sweating      Vital Signs Last 24 Hrs  T(C): 36.8 (04 Sep 2021 08:42), Max: 36.8 (03 Sep 2021 16:07)  T(F): 98.3 (04 Sep 2021 08:42), Max: 98.3 (03 Sep 2021 16:07)  HR: 74 (04 Sep 2021 08:42) (66 - 74)  BP: 135/68 (04 Sep 2021 08:42) (135/68 - 158/75)  BP(mean): --  RR: 18 (04 Sep 2021 08:42) (18 - 18)  SpO2: 95% (04 Sep 2021 08:42) (95% - 96%)                          11.0   18.42 )-----------( 271      ( 03 Sep 2021 06:59 )             35.4    09-03    139  |  102  |  18  ----------------------------<  193<H>  4.3   |  24  |  0.70    Ca    9.2      03 Sep 2021 06:59         PHYSICAL EXAM:  Gen: NAD  Skin: No rashes, bruises, or lesions  Head: Normocephalic, Atraumatic  Face: R parotid firm swelling with mproving extension to cheek, tender to deep palpation   Eyes: no scleral injection  Nose: Nares bilaterally patent, no discharge  Mouth: No Stridor / Drooling / Trismus.  Mucosa moist, tongue/uvula midline  Neck: Flat, supple, no lymphadenopathy, trachea midline, no masses  Lymphatic: No lymphadenopathy  Resp: breathing easily, no stridor  Neuro: facial nerve intact, no facial droop

## 2021-09-04 NOTE — PROGRESS NOTE ADULT - ASSESSMENT
71 yo female w extensive R parotitis w adjacent soft tissue swelling and cellulitis. Pt continues to improve clinically, currently on IV unasyn, swelling decreased significantly. Brown murky fluid expressed from Stensen's duct. No stone felt

## 2021-09-04 NOTE — DIETITIAN INITIAL EVALUATION ADULT. - REASON FOR ADMISSION
right facial swelling    Per chart: "69 yo female with PMH of HTN, HLD, PAD s/p Left BKA p/w right facial swelling diagnosed with left parotitis and adjacent cellulitis of the face."

## 2021-09-04 NOTE — PROGRESS NOTE ADULT - SUBJECTIVE AND OBJECTIVE BOX
Patient is a 70y old  Female who presents with a chief complaint of right facial swelling (04 Sep 2021 12:26)      DATE OF SERVICE: 09-04-21 @ 13:07    SUBJECTIVE / OVERNIGHT EVENTS: overnight events noted  further improvement right face     ROS:  Resp: No cough no sputum production  CVS: No chest pain no palpitations no orthopnea  GI: no N/V/D  : no dysuria, no hematuria  Neuro: no weakness no paresthesias  Heme: No petechiae no easy bruising  Msk: No joint pain no swelling  Skin: No rash no itching        MEDICATIONS  (STANDING):  ascorbic acid 500 milliGRAM(s) Oral daily  atorvastatin 40 milliGRAM(s) Oral at bedtime  calcium carbonate 1250 mG  + Vitamin D (OsCal 500 + D) 1 Tablet(s) Oral daily  cholecalciferol 1000 Unit(s) Oral daily  enoxaparin Injectable 40 milliGRAM(s) SubCutaneous daily  ertapenem  IVPB      ertapenem  IVPB 1000 milliGRAM(s) IV Intermittent once  influenza   Vaccine 0.5 milliLiter(s) IntraMuscular once  loratadine 10 milliGRAM(s) Oral daily  NIFEdipine XL 60 milliGRAM(s) Oral daily    MEDICATIONS  (PRN):  famotidine    Tablet 20 milliGRAM(s) Oral two times a day PRN dyspepsia  morphine  - Injectable 2 milliGRAM(s) IV Push every 6 hours PRN Severe Pain (7 - 10)  senna 1 Tablet(s) Oral daily PRN Constipation        CAPILLARY BLOOD GLUCOSE        I&O's Summary    03 Sep 2021 07:01  -  04 Sep 2021 07:00  --------------------------------------------------------  IN: 900 mL / OUT: 2250 mL / NET: -1350 mL    04 Sep 2021 07:01  -  04 Sep 2021 13:07  --------------------------------------------------------  IN: 380 mL / OUT: 0 mL / NET: 380 mL        Vital Signs Last 24 Hrs  T(C): 36.8 (04 Sep 2021 08:42), Max: 36.8 (03 Sep 2021 16:07)  T(F): 98.3 (04 Sep 2021 08:42), Max: 98.3 (03 Sep 2021 16:07)  HR: 74 (04 Sep 2021 08:42) (66 - 74)  BP: 135/68 (04 Sep 2021 08:42) (135/68 - 158/75)  BP(mean): --  RR: 18 (04 Sep 2021 08:42) (18 - 18)  SpO2: 95% (04 Sep 2021 08:42) (95% - 96%)    PHYSICAL EXAM:   HEENT: ARACELIS EOMI  right facial firm swelling further improved   Neck: Supple, no JVD  Lungs: clear   CVS: S1 S2 no M/R/G  Abdomen: no tenderness  Neuro: AO x 3 non focal   Ext: no edema right leg    LABS:                        11.0   18.42 )-----------( 271      ( 03 Sep 2021 06:59 )             35.4     09-03    139  |  102  |  18  ----------------------------<  193<H>  4.3   |  24  |  0.70    Ca    9.2      03 Sep 2021 06:59                  All consultant(s) notes reviewed and care discussed with other providers        Contact Number, Dr Gayle [phone]

## 2021-09-04 NOTE — DIETITIAN INITIAL EVALUATION ADULT. - PERTINENT MEDS FT
MEDICATIONS  (STANDING):  ampicillin/sulbactam  IVPB 3 Gram(s) IV Intermittent every 6 hours  ascorbic acid 500 milliGRAM(s) Oral daily  atorvastatin 40 milliGRAM(s) Oral at bedtime  calcium carbonate 1250 mG  + Vitamin D (OsCal 500 + D) 1 Tablet(s) Oral daily  cholecalciferol 1000 Unit(s) Oral daily  enoxaparin Injectable 40 milliGRAM(s) SubCutaneous daily  influenza   Vaccine 0.5 milliLiter(s) IntraMuscular once  loratadine 10 milliGRAM(s) Oral daily  NIFEdipine XL 60 milliGRAM(s) Oral daily

## 2021-09-04 NOTE — PROGRESS NOTE ADULT - ASSESSMENT
71 yo female with PMH of HTN, HLD, PAD s/p Left BKA p/w right facial swelling.  Patient reports that she has been having dental work over the past few weeks.  Everything had been going well up until last week.  Patient was seen by her dentist again last Tuesday, two days later she noticed right facial swelling.  Went back to her dentist who sent her to an oral surgeon yesterday.  Oral surgeon recommended patient come to the Emergency Department.   R parotitis  ? sialadenitis  ? abscess vs swelling with inspisated secretions  Leucocytosis though also on steroids  Overall she feels better since admission  swelling appears decreased   ESBL Kleb !!! and enterococcus on Cx-unclear to me where this was obtained from  while could represent colonization/contamination given recent amox use + above will change coverage to Invanz    Rec:  A) Parotitis/sialadenitis with ?? abscess vs secretions  follow clinically  follow Cx  may need drainage vs marsupialization of stone  change coverage to [facility]  ENT follow up    b) leucocytosis  likely from above + steroids  monitor and trend  plana s above    Will tailor plan for ID issues  per course,results.Will defer to primary team on management of other issues.  Assessment, plan and recommendations as detailed above were discussed with the medical/primary  team   Infectious Diseases Service will cover over weekend.  Please call [phone] if issues   71 yo female with PMH of HTN, HLD, PAD s/p Left BKA p/w right facial swelling.  Patient reports that she has been having dental work over the past few weeks.  Everything had been going well up until last week.  Patient was seen by her dentist again last Tuesday, two days later she noticed right facial swelling.  Went back to her dentist who sent her to an oral surgeon yesterday.  Oral surgeon recommended patient come to the Emergency Department.   R parotitis  ? sialadenitis  ? abscess vs swelling with inspisated secretions  Leucocytosis though also on steroids  Overall she feels better since admission  swelling appears decreased   ESBL Kleb !!! and enterococcus on Cx-unclear to me where this was obtained from  while could represent colonization/contamination given recent amox use + above will change coverage to Invanz  will follow second Cx sent by ENT and adjust abx accordingly    Rec:  A) Parotitis/sialadenitis with ?? abscess vs secretions  follow clinically  follow Cx  may need drainage vs marsupialization of stone  change coverage to Inavnz  ENT follow up    b) leucocytosis  likely from above + steroids  monitor and trend  plana s above    Will tailor plan for ID issues  per course,results.Will defer to primary team on management of other issues.  Assessment, plan and recommendations as detailed above were discussed with the medical/primary  team   Infectious Diseases Service will cover over weekend.  Please call [phone] if issues

## 2021-09-04 NOTE — DIETITIAN INITIAL EVALUATION ADULT. - ORAL NUTRITION SUPPLEMENTS
Nectar Consistency No Sugar Added Mighty Shake supplement  (200 calories, 7 gm protein) added to meal trays.

## 2021-09-04 NOTE — DIETITIAN INITIAL EVALUATION ADULT. - OTHER INFO
GASTROINTESTINAL:  Last BM: none noted  Bowel Regimen: none    NUTRITION STATUS:  - Supplementation: vit C, OsCal 500+D, vit D3  - New DM2 Dx (A1c 6.8%) in 71yo; hyperglycemia noted; no SSI ordered    WEIGHT HISTORY: unavailable GASTROINTESTINAL:  Last BM: none noted  Bowel Regimen: none    NUTRITION STATUS:  - Supplementation: vit C, OsCal 500+D, vit D3  - A1c 6.8% in 69yo; steroid-induced hyperglycemia noted in-house; no SSI ordered    WEIGHT HISTORY: pt endorses stable weight

## 2021-09-04 NOTE — DIETITIAN INITIAL EVALUATION ADULT. - PHYSCIAL ASSESSMENT
182% IBW  Skin: no pressure injuries documented well nourished/obese 188% Amputation Adjusted IBW 48.3kg (h/o L BKA)  Skin: no pressure injuries documented

## 2021-09-04 NOTE — DIETITIAN INITIAL EVALUATION ADULT. - CONTINUE CURRENT NUTRITION CARE PLAN
Continue Mechanical Soft diet/yes Continue Mechanical Soft diet; monitor need for Consistent Carbohydrate diet restriction. Food preferences taken./yes

## 2021-09-04 NOTE — DIETITIAN INITIAL EVALUATION ADULT. - REASON INDICATOR FOR ASSESSMENT
Nutrition Consult for Significant Decrease in PO Intake x 3 Days PTA received and appreciated.   Information obtained from: medical record, communication with team. [pt interview pending] Nutrition Consult for Significant Decrease in PO Intake x 3 Days PTA received and appreciated.   Information obtained from: patient, medical record, communication with team.

## 2021-09-05 LAB
-  AMIKACIN: SIGNIFICANT CHANGE UP
-  AMOXICILLIN/CLAVULANIC ACID: SIGNIFICANT CHANGE UP
-  AMPICILLIN/SULBACTAM: SIGNIFICANT CHANGE UP
-  AMPICILLIN: SIGNIFICANT CHANGE UP
-  AZTREONAM: SIGNIFICANT CHANGE UP
-  CEFAZOLIN: SIGNIFICANT CHANGE UP
-  CEFEPIME: SIGNIFICANT CHANGE UP
-  CEFOXITIN: SIGNIFICANT CHANGE UP
-  CEFTRIAXONE: SIGNIFICANT CHANGE UP
-  CIPROFLOXACIN: SIGNIFICANT CHANGE UP
-  ERTAPENEM: SIGNIFICANT CHANGE UP
-  GENTAMICIN: SIGNIFICANT CHANGE UP
-  LEVOFLOXACIN: SIGNIFICANT CHANGE UP
-  MEROPENEM: SIGNIFICANT CHANGE UP
-  PIPERACILLIN/TAZOBACTAM: SIGNIFICANT CHANGE UP
-  TOBRAMYCIN: SIGNIFICANT CHANGE UP
-  TRIMETHOPRIM/SULFAMETHOXAZOLE: SIGNIFICANT CHANGE UP
METHOD TYPE: SIGNIFICANT CHANGE UP

## 2021-09-05 PROCEDURE — 99232 SBSQ HOSP IP/OBS MODERATE 35: CPT

## 2021-09-05 RX ADMIN — ERTAPENEM SODIUM 120 MILLIGRAM(S): 1 INJECTION, POWDER, LYOPHILIZED, FOR SOLUTION INTRAMUSCULAR; INTRAVENOUS at 12:40

## 2021-09-05 RX ADMIN — Medication 1 TABLET(S): at 12:47

## 2021-09-05 RX ADMIN — Medication 1000 UNIT(S): at 12:47

## 2021-09-05 RX ADMIN — ATORVASTATIN CALCIUM 40 MILLIGRAM(S): 80 TABLET, FILM COATED ORAL at 21:15

## 2021-09-05 RX ADMIN — LORATADINE 10 MILLIGRAM(S): 10 TABLET ORAL at 12:48

## 2021-09-05 RX ADMIN — Medication 60 MILLIGRAM(S): at 05:46

## 2021-09-05 RX ADMIN — ENOXAPARIN SODIUM 40 MILLIGRAM(S): 100 INJECTION SUBCUTANEOUS at 12:48

## 2021-09-05 RX ADMIN — Medication 500 MILLIGRAM(S): at 12:48

## 2021-09-05 NOTE — PROGRESS NOTE ADULT - ASSESSMENT
71 yo female w extensive R parotitis w adjacent soft tissue swelling and cellulitis. Pt continues to improve clinically, currently on IV unasyn, swelling decreased significantly. Brown murky fluid expressed from Stensen's duct. No stone felt. Cx grew klebsiella, abx changed to ertepenem

## 2021-09-05 NOTE — PROGRESS NOTE ADULT - SUBJECTIVE AND OBJECTIVE BOX
Patient is a 70y old  Female who presents with a chief complaint of right facial swelling (05 Sep 2021 12:35)      DATE OF SERVICE: 09-05-21 @ 13:36    SUBJECTIVE / OVERNIGHT EVENTS: overnight events noted  slow improvement right face    ROS:  Resp: No cough no sputum production  CVS: No chest pain no palpitations no orthopnea  GI: no N/V/D  : no dysuria, no hematuria  Neuro: no weakness no paresthesias          MEDICATIONS  (STANDING):  ascorbic acid 500 milliGRAM(s) Oral daily  atorvastatin 40 milliGRAM(s) Oral at bedtime  calcium carbonate 1250 mG  + Vitamin D (OsCal 500 + D) 1 Tablet(s) Oral daily  cholecalciferol 1000 Unit(s) Oral daily  enoxaparin Injectable 40 milliGRAM(s) SubCutaneous daily  ertapenem  IVPB      ertapenem  IVPB 1000 milliGRAM(s) IV Intermittent every 24 hours  loratadine 10 milliGRAM(s) Oral daily  NIFEdipine XL 60 milliGRAM(s) Oral daily    MEDICATIONS  (PRN):  famotidine    Tablet 20 milliGRAM(s) Oral two times a day PRN dyspepsia  morphine  - Injectable 2 milliGRAM(s) IV Push every 6 hours PRN Severe Pain (7 - 10)  senna 1 Tablet(s) Oral daily PRN Constipation        CAPILLARY BLOOD GLUCOSE        I&O's Summary    04 Sep 2021 07:01  -  05 Sep 2021 07:00  --------------------------------------------------------  IN: 900 mL / OUT: 1601 mL / NET: -701 mL    05 Sep 2021 07:01  -  05 Sep 2021 13:36  --------------------------------------------------------  IN: 650 mL / OUT: 0 mL / NET: 650 mL        Vital Signs Last 24 Hrs  T(C): 36.5 (05 Sep 2021 08:26), Max: 36.9 (05 Sep 2021 05:47)  T(F): 97.7 (05 Sep 2021 08:26), Max: 98.4 (05 Sep 2021 05:47)  HR: 67 (05 Sep 2021 08:26) (67 - 69)  BP: 139/80 (05 Sep 2021 08:26) (127/77 - 153/71)  BP(mean): --  RR: 18 (05 Sep 2021 08:26) (16 - 18)  SpO2: 96% (05 Sep 2021 08:26) (96% - 98%)    PHYSICAL EXAM:   HEENT: ARACELIS EOMI  right facial firm swelling no change overnight   Neck: Supple, no JVD  Lungs: clear   CVS: S1 S2 no M/R/G  Abdomen: no tenderness  Neuro: AO x 3 non focal   Ext: no edema right leg    LABS:                      All consultant(s) notes reviewed and care discussed with other providers        Contact Number, Dr Gayle [phone]

## 2021-09-05 NOTE — PROGRESS NOTE ADULT - ASSESSMENT
69 yo female with PMH of HTN, HLD, PAD s/p Left BKA p/w right facial swelling.  Patient reports that she has been having dental work over the past few weeks.  Everything had been going well up until last week.  Patient was seen by her dentist again last Tuesday, two days later she noticed right facial swelling.  Went back to her dentist who sent her to an oral surgeon yesterday.  Oral surgeon recommended patient come to the Emergency Department.   R parotitis  ? sialadenitis  ? abscess vs swelling with inspisated secretions  Leucocytosis though also on steroids  Overall she feels better since admission  swelling appears decreased   ESBL Kleb !!! and enterococcus on Cx-unclear to me where this was obtained from  repeat cx with kleb as well  on Invanz  Rec:  A) Parotitis/sialadenitis with ?? abscess vs secretions  follow clinically  follow Cx  may need drainage vs marsupialization of stone  continue  Inavnz  If stable could change to po levaquin + augmentin later   ENT follow up    b) leucocytosis  likely from above + steroids  monitor and trend  plana s above    Will tailor plan for ID issues  per course,results.Will defer to primary team on management of other issues.  Assessment, plan and recommendations as detailed above were discussed with the medical/primary  team   Infectious Diseases Service will cover over weekend.  Please call [phone] if issues

## 2021-09-05 NOTE — PROGRESS NOTE ADULT - SUBJECTIVE AND OBJECTIVE BOX
ENT ISSUE/POD:R parotitis and facial cellulitis      HPI: 69 yo F a R parotitis and adjacent facial cellulitis and soft tissue edema after dental work admitted for IV unasyn and decadron. Pt reports feeling improved with mild pain and swelling made better with hot packs. Pt tolerating PO, mostly soft foods. Cx back as klebsiella, ID changed abx to ertapenem        PAST MEDICAL & SURGICAL HISTORY:  Mild HTN    HLD (hyperlipidemia)    S/P below knee amputation, left    H/O total hip arthroplasty, left      Allergies    No Known Allergies    Intolerances      MEDICATIONS  (STANDING):  ascorbic acid 500 milliGRAM(s) Oral daily  atorvastatin 40 milliGRAM(s) Oral at bedtime  calcium carbonate 1250 mG  + Vitamin D (OsCal 500 + D) 1 Tablet(s) Oral daily  cholecalciferol 1000 Unit(s) Oral daily  enoxaparin Injectable 40 milliGRAM(s) SubCutaneous daily  ertapenem  IVPB      ertapenem  IVPB 1000 milliGRAM(s) IV Intermittent every 24 hours  influenza   Vaccine 0.5 milliLiter(s) IntraMuscular once  loratadine 10 milliGRAM(s) Oral daily  NIFEdipine XL 60 milliGRAM(s) Oral daily    MEDICATIONS  (PRN):  famotidine    Tablet 20 milliGRAM(s) Oral two times a day PRN dyspepsia  morphine  - Injectable 2 milliGRAM(s) IV Push every 6 hours PRN Severe Pain (7 - 10)  senna 1 Tablet(s) Oral daily PRN Constipation      ROS:   ENT: all negative except as noted in HPI   Pulm: denies SOB, cough, hemoptysis  Neuro: denies numbness/tingling, loss of sensation  Endo: denies heat/cold intolerance, excessive sweating      Vital Signs Last 24 Hrs  T(C): 36.5 (05 Sep 2021 08:26), Max: 36.9 (05 Sep 2021 05:47)  T(F): 97.7 (05 Sep 2021 08:26), Max: 98.4 (05 Sep 2021 05:47)  HR: 67 (05 Sep 2021 08:26) (67 - 69)  BP: 139/80 (05 Sep 2021 08:26) (127/77 - 153/71)  BP(mean): --  RR: 18 (05 Sep 2021 08:26) (16 - 18)  SpO2: 96% (05 Sep 2021 08:26) (96% - 98%)              PHYSICAL EXAM:  Gen: NAD  Skin: No rashes, bruises, or lesions  Head: Normocephalic, Atraumatic  Face: R parotid firm swelling with improving extension to cheek, tender to deep palpation   Eyes: no scleral injection  Nose: Nares bilaterally patent, no discharge  Mouth: No Stridor / Drooling / Trismus.  Mucosa moist, tongue/uvula midline  Neck: Flat, supple, no lymphadenopathy, trachea midline, no masses  Lymphatic: No lymphadenopathy  Resp: breathing easily, no stridor  Neuro: facial nerve intact, no facial droop

## 2021-09-05 NOTE — PROGRESS NOTE ADULT - SUBJECTIVE AND OBJECTIVE BOX
Patient is a 70y old  Female who presents with a chief complaint of right facial swelling (05 Sep 2021 09:27)    Being followed by ID for R parotitis    Interval history:feels better  swelling decreasing   No acute events      ROS:  No cough,SOB,CP  No N/V/D./abd pain  No other complaints      Antimicrobials:    ertapenem  IVPB      ertapenem  IVPB 1000 milliGRAM(s) IV Intermittent every 24 hours    Other medications reviewed    Vital Signs Last 24 Hrs  T(C): 36.5 (09-05-21 @ 08:26), Max: 36.9 (09-05-21 @ 05:47)  T(F): 97.7 (09-05-21 @ 08:26), Max: 98.4 (09-05-21 @ 05:47)  HR: 67 (09-05-21 @ 08:26) (67 - 69)  BP: 139/80 (09-05-21 @ 08:26) (127/77 - 153/71)  BP(mean): --  RR: 18 (09-05-21 @ 08:26) (16 - 18)  SpO2: 96% (09-05-21 @ 08:26) (96% - 98%)    Physical Exam:    HEENT PERRLA EOMI  R facial and parotid area swelling -slight decrease  Mild tenderness    No oral exudate or erythema    Chest Good AE,CTA    CVS RRR S1 S2 WNl No murmur or rub or gallop    Abd soft BS normal No tenderness no masses    R leg BKA no tenderness or discharge     IV site no erythema tenderness or discharge    CNS AAO X 3 no focal  Lab Data:      labs pending             Culture - Body Fluid with Gram Stain (collected 03 Sep 2021 10:08)  Source: .Body Fluid right parotid gland  Gram Stain (03 Sep 2021 14:20):    Rare polymorphonuclear leukocytes seen per low power field    No organisms seen per oil power field  Preliminary Report (04 Sep 2021 14:52):    Few Klebsiella pneumoniae    Culture - Abscess with Gram Stain (collected 02 Sep 2021 17:06)  Source: .Abscess R parotid  Preliminary Report (04 Sep 2021 14:20):    Rare Klebsiella pneumoniae ESBL    Rare Enterococcus faecalis    Rare Rehana albicans "Susceptibilities not performed"    Few Alpha hemolytic strep "Susceptibilities not performed"  Organism: Klebsiella pneumoniae ESBL  Enterococcus faecalis (04 Sep 2021 15:10)  Organism: Enterococcus faecalis (04 Sep 2021 15:10)      -  Ampicillin: S <=2 Predicts results to ampicillin/sulbactam, amoxacillin-clavulanate and  piperacillin-tazobactam.      -  Tetra/Doxy: R >8      -  Vancomycin: S 2      Method Type: JOANN  Organism: Klebsiella pneumoniae ESBL (04 Sep 2021 09:36)      -  Amikacin: S <=16      -  Amoxicillin/Clavulanic Acid: S <=8/4      -  Ampicillin: R >16 These ampicillin results predict results for amoxicillin      -  Ampicillin/Sulbactam: R >16/8 Enterobacter, Citrobacter, and Serratia may develop resistance during prolonged therapy (3-4 days)      -  Aztreonam: R >16      -  Cefazolin: R >16 Enterobacter, Citrobacter, and Serratia may develop resistance during prolonged therapy (3-4 days)      -  Cefepime: R >16      -  Cefoxitin: S <=8      -  Ceftriaxone: R >32 Enterobacter, Citrobacter, and Serratia may develop resistance during prolonged therapy      -  Ciprofloxacin: I 0.5      -  Ertapenem: S <=0.5      -  Gentamicin: R >8      -  Imipenem: S <=1      -  Levofloxacin: S <=0.5      -  Meropenem: S <=1      -  Piperacillin/Tazobactam: R <=8      -  Tobramycin: I 8      -  Trimethoprim/Sulfamethoxazole: R >2/38      Method Type: JOANN    Culture - Blood (collected 01 Sep 2021 13:22)  Source: .Blood Blood-Venous  Preliminary Report (02 Sep 2021 14:02):    No growth to date.    Culture - Blood (collected 01 Sep 2021 11:14)  Source: .Blood Blood-Venous  Preliminary Report (02 Sep 2021 12:01):    No growth to date.      < from: 12 Lead ECG (09.01.21 @ 07:31) >  QTC Calculation(Bazett) 438 ms    < end of copied text >

## 2021-09-06 LAB
ANION GAP SERPL CALC-SCNC: 14 MMOL/L — SIGNIFICANT CHANGE UP (ref 5–17)
BUN SERPL-MCNC: 22 MG/DL — SIGNIFICANT CHANGE UP (ref 7–23)
CALCIUM SERPL-MCNC: 9.3 MG/DL — SIGNIFICANT CHANGE UP (ref 8.4–10.5)
CHLORIDE SERPL-SCNC: 105 MMOL/L — SIGNIFICANT CHANGE UP (ref 96–108)
CO2 SERPL-SCNC: 21 MMOL/L — LOW (ref 22–31)
CREAT SERPL-MCNC: 0.86 MG/DL — SIGNIFICANT CHANGE UP (ref 0.5–1.3)
CULTURE RESULTS: SIGNIFICANT CHANGE UP
CULTURE RESULTS: SIGNIFICANT CHANGE UP
GLUCOSE SERPL-MCNC: 149 MG/DL — HIGH (ref 70–99)
HCT VFR BLD CALC: 37.5 % — SIGNIFICANT CHANGE UP (ref 34.5–45)
HGB BLD-MCNC: 11.5 G/DL — SIGNIFICANT CHANGE UP (ref 11.5–15.5)
MCHC RBC-ENTMCNC: 26.2 PG — LOW (ref 27–34)
MCHC RBC-ENTMCNC: 30.7 GM/DL — LOW (ref 32–36)
MCV RBC AUTO: 85.4 FL — SIGNIFICANT CHANGE UP (ref 80–100)
NRBC # BLD: 0 /100 WBCS — SIGNIFICANT CHANGE UP (ref 0–0)
PLATELET # BLD AUTO: 311 K/UL — SIGNIFICANT CHANGE UP (ref 150–400)
POTASSIUM SERPL-MCNC: 3.9 MMOL/L — SIGNIFICANT CHANGE UP (ref 3.5–5.3)
POTASSIUM SERPL-SCNC: 3.9 MMOL/L — SIGNIFICANT CHANGE UP (ref 3.5–5.3)
RBC # BLD: 4.39 M/UL — SIGNIFICANT CHANGE UP (ref 3.8–5.2)
RBC # FLD: 15.7 % — HIGH (ref 10.3–14.5)
SODIUM SERPL-SCNC: 140 MMOL/L — SIGNIFICANT CHANGE UP (ref 135–145)
SPECIMEN SOURCE: SIGNIFICANT CHANGE UP
SPECIMEN SOURCE: SIGNIFICANT CHANGE UP
WBC # BLD: 11.76 K/UL — HIGH (ref 3.8–10.5)
WBC # FLD AUTO: 11.76 K/UL — HIGH (ref 3.8–10.5)

## 2021-09-06 PROCEDURE — 99232 SBSQ HOSP IP/OBS MODERATE 35: CPT

## 2021-09-06 RX ORDER — PANTOPRAZOLE SODIUM 20 MG/1
40 TABLET, DELAYED RELEASE ORAL ONCE
Refills: 0 | Status: COMPLETED | OUTPATIENT
Start: 2021-09-06 | End: 2021-09-06

## 2021-09-06 RX ORDER — ACETAMINOPHEN 500 MG
650 TABLET ORAL EVERY 6 HOURS
Refills: 0 | Status: DISCONTINUED | OUTPATIENT
Start: 2021-09-06 | End: 2021-09-08

## 2021-09-06 RX ORDER — FAMOTIDINE 10 MG/ML
20 INJECTION INTRAVENOUS DAILY
Refills: 0 | Status: DISCONTINUED | OUTPATIENT
Start: 2021-09-06 | End: 2021-09-08

## 2021-09-06 RX ADMIN — Medication 1 TABLET(S): at 11:54

## 2021-09-06 RX ADMIN — Medication 500 MILLIGRAM(S): at 11:55

## 2021-09-06 RX ADMIN — ENOXAPARIN SODIUM 40 MILLIGRAM(S): 100 INJECTION SUBCUTANEOUS at 11:55

## 2021-09-06 RX ADMIN — LORATADINE 10 MILLIGRAM(S): 10 TABLET ORAL at 11:55

## 2021-09-06 RX ADMIN — Medication 1000 UNIT(S): at 11:55

## 2021-09-06 RX ADMIN — PANTOPRAZOLE SODIUM 40 MILLIGRAM(S): 20 TABLET, DELAYED RELEASE ORAL at 13:54

## 2021-09-06 RX ADMIN — Medication 60 MILLIGRAM(S): at 08:32

## 2021-09-06 RX ADMIN — ATORVASTATIN CALCIUM 40 MILLIGRAM(S): 80 TABLET, FILM COATED ORAL at 21:38

## 2021-09-06 RX ADMIN — ERTAPENEM SODIUM 120 MILLIGRAM(S): 1 INJECTION, POWDER, LYOPHILIZED, FOR SOLUTION INTRAMUSCULAR; INTRAVENOUS at 11:58

## 2021-09-06 NOTE — PROGRESS NOTE ADULT - SUBJECTIVE AND OBJECTIVE BOX
ENT ISSUE/POD: R parotitis and facial cellulitis      HPI: 69 yo F a R parotitis and adjacent facial cellulitis and soft tissue edema after dental work admitted for IV unasyn and decadron. Pt reports feeling improved with mild pain and swelling made better with hot packs. Pt tolerating PO, mostly soft foods. Cx back as klebsiella, ID changed abx to ertapenem. Pt w mild GI discomfort since made better with antiacids      PAST MEDICAL & SURGICAL HISTORY:  Mild HTN    HLD (hyperlipidemia)    S/P below knee amputation, left    H/O total hip arthroplasty, left      Allergies    No Known Allergies    Intolerances      MEDICATIONS  (STANDING):  ascorbic acid 500 milliGRAM(s) Oral daily  atorvastatin 40 milliGRAM(s) Oral at bedtime  calcium carbonate 1250 mG  + Vitamin D (OsCal 500 + D) 1 Tablet(s) Oral daily  cholecalciferol 1000 Unit(s) Oral daily  enoxaparin Injectable 40 milliGRAM(s) SubCutaneous daily  ertapenem  IVPB      ertapenem  IVPB 1000 milliGRAM(s) IV Intermittent every 24 hours  loratadine 10 milliGRAM(s) Oral daily  NIFEdipine XL 60 milliGRAM(s) Oral daily    MEDICATIONS  (PRN):  famotidine    Tablet 20 milliGRAM(s) Oral two times a day PRN dyspepsia  morphine  - Injectable 2 milliGRAM(s) IV Push every 6 hours PRN Severe Pain (7 - 10)  senna 1 Tablet(s) Oral daily PRN Constipation      ROS:   ENT: all negative except as noted in HPI   Pulm: denies SOB, cough, hemoptysis  Neuro: denies numbness/tingling, loss of sensation  Endo: denies heat/cold intolerance, excessive sweating      Vital Signs Last 24 Hrs  T(C): 36.7 (06 Sep 2021 08:16), Max: 36.9 (05 Sep 2021 16:10)  T(F): 98 (06 Sep 2021 08:16), Max: 98.5 (05 Sep 2021 16:10)  HR: 77 (06 Sep 2021 08:16) (68 - 77)  BP: 118/59 (06 Sep 2021 08:16) (118/59 - 138/71)  BP(mean): --  RR: 18 (06 Sep 2021 08:16) (18 - 18)  SpO2: 96% (06 Sep 2021 08:16) (96% - 97%)                          11.5   11.76 )-----------( 311      ( 06 Sep 2021 06:04 )             37.5    09-06    140  |  105  |  22  ----------------------------<  149<H>  3.9   |  21<L>  |  0.86    Ca    9.3      06 Sep 2021 06:04         PHYSICAL EXAM:  Gen: NAD  Skin: No rashes, bruises, or lesions  Head: Normocephalic, Atraumatic  Face: R parotid firm swelling with improving extension to cheek  Eyes: no scleral injection  Nose: Nares bilaterally patent, no discharge  Mouth: No Stridor / Drooling / Trismus.  Mucosa moist, tongue/uvula midline  Neck: Flat, supple, no lymphadenopathy, trachea midline, no masses  Lymphatic: No lymphadenopathy  Resp: breathing easily, no stridor  Neuro: facial nerve intact, no facial droop

## 2021-09-06 NOTE — CHART NOTE - NSCHARTNOTEFT_GEN_A_CORE
Reported by RN that patient c/o R sided chest pain, and said pain better now after taking her own pepcid.   seen the patient, not in any distress. She reports pain at her R chest was like 10/10, pain radiates to her back  when she tries to reach  over something. Also she said, has a h/o GERD, taking pepcid daily at home, she believes,   acid reflex is causing the pain. On exam, no chest tenderness, but (+) tenderness mid thoracic area. Denies constipation  Patient is a 70y old  Female who presents with a chief complaint of right facial swelling (06 Sep 2021 10:41)      Vital Signs Last 24 Hrs  T(C): 36.8 (06 Sep 2021 10:45), Max: 36.9 (05 Sep 2021 16:10)  T(F): 98.2 (06 Sep 2021 10:45), Max: 98.5 (05 Sep 2021 16:10)  HR: 68 (06 Sep 2021 10:45) (68 - 77)  BP: 125/72 (06 Sep 2021 10:45) (118/59 - 138/71)  BP(mean): --  RR: 18 (06 Sep 2021 10:45) (18 - 18)  SpO2: 94% (06 Sep 2021 10:45) (94% - 97%)      Labs:                          11.5   11.76 )-----------( 311      ( 06 Sep 2021 06:04 )             37.5     09-06    140  |  105  |  22  ----------------------------<  149<H>  3.9   |  21<L>  |  0.86    Ca    9.3      06 Sep 2021 06:04              Radiology:    Physical Exam:  General: WN/WD NAD  Neurology: A&Ox3, nonfocal, SNOWDEN x 4  Head:  Normocephalic, atraumatic  Respiratory: CTA B/L  CV: RRR, S1S2, no murmur  Abdominal: Soft, NT, ND, (+) BS  MSK: No edema, +  FROM all 4 extremity(L BKA)            (+) tenderness over mid thoracic area  Assessment & Plan  69 yo female with PMH of HTN, HLD, PAD s/p Left BKA p/w right facial swelling.  Patient reports that she has been having dental work over the past few weeks.  Everything had been going well up until last week.  Patient was seen by her dentist again last Tuesday, two days later she noticed right facial swelling.  Went back to her dentist who sent her to an oral surgeon yesterday.  Oral surgeon recommended patient come to the Emergency Department.  Admitted with parotitis. Completed a course of  steroid, on abx . Now c/o R sided chest pain.  1)  Chest pain likely from muscle strain          -Tylenol prn  Dorothy Haines NP  [phone]

## 2021-09-06 NOTE — PROVIDER CONTACT NOTE (OTHER) - ASSESSMENT
Patient c/o chest pain, took own famotidine with partial relief. vss. patient states she takes famotidine at home and she has not been receiving it since admission to the hospital. patient believes this is gas pain.

## 2021-09-06 NOTE — PROGRESS NOTE ADULT - SUBJECTIVE AND OBJECTIVE BOX
Patient is a 70y old  Female who presents with a chief complaint of right facial swelling (06 Sep 2021 10:41)      DATE OF SERVICE: 09-06-21 @ 12:19    SUBJECTIVE / OVERNIGHT EVENTS: overnight events noted  states further improved down to 3/10 from 10/10 on admission     ROS:  Resp: No cough no sputum production  CVS: No chest pain no palpitations no orthopnea  GI: no N/V/D  : no dysuria, no hematuria  Neuro: no weakness no paresthesias          MEDICATIONS  (STANDING):  ascorbic acid 500 milliGRAM(s) Oral daily  atorvastatin 40 milliGRAM(s) Oral at bedtime  calcium carbonate 1250 mG  + Vitamin D (OsCal 500 + D) 1 Tablet(s) Oral daily  cholecalciferol 1000 Unit(s) Oral daily  enoxaparin Injectable 40 milliGRAM(s) SubCutaneous daily  ertapenem  IVPB      ertapenem  IVPB 1000 milliGRAM(s) IV Intermittent every 24 hours  famotidine    Tablet 20 milliGRAM(s) Oral daily  loratadine 10 milliGRAM(s) Oral daily  NIFEdipine XL 60 milliGRAM(s) Oral daily  pantoprazole  Injectable 40 milliGRAM(s) IV Push once    MEDICATIONS  (PRN):  acetaminophen   Tablet .. 650 milliGRAM(s) Oral every 6 hours PRN Mild Pain (1 - 3), Moderate Pain (4 - 6)  morphine  - Injectable 2 milliGRAM(s) IV Push every 6 hours PRN Severe Pain (7 - 10)  senna 1 Tablet(s) Oral daily PRN Constipation        CAPILLARY BLOOD GLUCOSE        I&O's Summary    05 Sep 2021 07:01  -  06 Sep 2021 07:00  --------------------------------------------------------  IN: 950 mL / OUT: 300 mL / NET: 650 mL        Vital Signs Last 24 Hrs  T(C): 36.8 (06 Sep 2021 10:45), Max: 36.9 (05 Sep 2021 16:10)  T(F): 98.2 (06 Sep 2021 10:45), Max: 98.5 (05 Sep 2021 16:10)  HR: 68 (06 Sep 2021 10:45) (68 - 77)  BP: 125/72 (06 Sep 2021 10:45) (118/59 - 138/71)  BP(mean): --  RR: 18 (06 Sep 2021 10:45) (18 - 18)  SpO2: 94% (06 Sep 2021 10:45) (94% - 97%)      PHYSICAL EXAM:   HEENT: ARACELIS EOMI  right facial firm swelling   no appreciable change  Neck: Supple, no JVD  Lungs: clear   CVS: S1 S2 no M/R/G  Abdomen: no tenderness  Neuro: AO x 3 non focal       LABS:                        11.5   11.76 )-----------( 311      ( 06 Sep 2021 06:04 )             37.5     09-06    140  |  105  |  22  ----------------------------<  149<H>  3.9   |  21<L>  |  0.86    Ca    9.3      06 Sep 2021 06:04                  All consultant(s) notes reviewed and care discussed with other providers        Contact Number, Dr Gayle [phone]

## 2021-09-07 LAB
CULTURE RESULTS: SIGNIFICANT CHANGE UP
HCT VFR BLD CALC: 36.4 % — SIGNIFICANT CHANGE UP (ref 34.5–45)
HGB BLD-MCNC: 11.4 G/DL — LOW (ref 11.5–15.5)
MCHC RBC-ENTMCNC: 26.9 PG — LOW (ref 27–34)
MCHC RBC-ENTMCNC: 31.3 GM/DL — LOW (ref 32–36)
MCV RBC AUTO: 85.8 FL — SIGNIFICANT CHANGE UP (ref 80–100)
NRBC # BLD: 0 /100 WBCS — SIGNIFICANT CHANGE UP (ref 0–0)
ORGANISM # SPEC MICROSCOPIC CNT: SIGNIFICANT CHANGE UP
PLATELET # BLD AUTO: 239 K/UL — SIGNIFICANT CHANGE UP (ref 150–400)
RBC # BLD: 4.24 M/UL — SIGNIFICANT CHANGE UP (ref 3.8–5.2)
RBC # FLD: 15.9 % — HIGH (ref 10.3–14.5)
SPECIMEN SOURCE: SIGNIFICANT CHANGE UP
WBC # BLD: 11.02 K/UL — HIGH (ref 3.8–10.5)
WBC # FLD AUTO: 11.02 K/UL — HIGH (ref 3.8–10.5)

## 2021-09-07 PROCEDURE — 99232 SBSQ HOSP IP/OBS MODERATE 35: CPT

## 2021-09-07 PROCEDURE — 99233 SBSQ HOSP IP/OBS HIGH 50: CPT

## 2021-09-07 PROCEDURE — 70491 CT SOFT TISSUE NECK W/DYE: CPT | Mod: 26

## 2021-09-07 PROCEDURE — 93010 ELECTROCARDIOGRAM REPORT: CPT

## 2021-09-07 RX ORDER — FLUCONAZOLE 150 MG/1
100 TABLET ORAL DAILY
Refills: 0 | Status: DISCONTINUED | OUTPATIENT
Start: 2021-09-07 | End: 2021-09-08

## 2021-09-07 RX ORDER — PANTOPRAZOLE SODIUM 20 MG/1
40 TABLET, DELAYED RELEASE ORAL ONCE
Refills: 0 | Status: COMPLETED | OUTPATIENT
Start: 2021-09-07 | End: 2021-09-07

## 2021-09-07 RX ADMIN — ERTAPENEM SODIUM 120 MILLIGRAM(S): 1 INJECTION, POWDER, LYOPHILIZED, FOR SOLUTION INTRAMUSCULAR; INTRAVENOUS at 12:50

## 2021-09-07 RX ADMIN — Medication 1000 UNIT(S): at 12:52

## 2021-09-07 RX ADMIN — LORATADINE 10 MILLIGRAM(S): 10 TABLET ORAL at 12:52

## 2021-09-07 RX ADMIN — ENOXAPARIN SODIUM 40 MILLIGRAM(S): 100 INJECTION SUBCUTANEOUS at 12:52

## 2021-09-07 RX ADMIN — Medication 500 MILLIGRAM(S): at 12:52

## 2021-09-07 RX ADMIN — Medication 60 MILLIGRAM(S): at 06:36

## 2021-09-07 RX ADMIN — FLUCONAZOLE 100 MILLIGRAM(S): 150 TABLET ORAL at 12:58

## 2021-09-07 RX ADMIN — FAMOTIDINE 20 MILLIGRAM(S): 10 INJECTION INTRAVENOUS at 12:52

## 2021-09-07 RX ADMIN — PANTOPRAZOLE SODIUM 40 MILLIGRAM(S): 20 TABLET, DELAYED RELEASE ORAL at 12:51

## 2021-09-07 RX ADMIN — ATORVASTATIN CALCIUM 40 MILLIGRAM(S): 80 TABLET, FILM COATED ORAL at 21:23

## 2021-09-07 RX ADMIN — Medication 1 TABLET(S): at 12:52

## 2021-09-07 NOTE — PROGRESS NOTE ADULT - SUBJECTIVE AND OBJECTIVE BOX
Patient is a 70y old  Female who presents with a chief complaint of right facial swelling (07 Sep 2021 12:33)    Being followed by ID for parotitis    Interval history:still with pain R face  also with numbness around R eye today   No acute events      ROS:  No cough,SOB,CP  No N/V/D./abd pain  No other complaints      Antimicrobials:    ertapenem  IVPB      ertapenem  IVPB 1000 milliGRAM(s) IV Intermittent every 24 hours  fluconAZOLE   Tablet 100 milliGRAM(s) Oral daily    Other medications reviewed    Vital Signs Last 24 Hrs  T(C): 36.8 (09-07-21 @ 08:12), Max: 36.9 (09-06-21 @ 19:28)  T(F): 98.2 (09-07-21 @ 08:12), Max: 98.4 (09-06-21 @ 19:28)  HR: 71 (09-07-21 @ 08:12) (71 - 94)  BP: 124/64 (09-07-21 @ 08:12) (124/64 - 147/69)  BP(mean): --  RR: 18 (09-07-21 @ 08:12) (18 - 19)  SpO2: 97% (09-07-21 @ 08:12) (94% - 97%)    Physical Exam:      HEENT PERRLA EOMI  R facial and parotid area swelling -slight decrease  Mild tenderness    No oral exudate or erythema    Chest Good AE,CTA    CVS RRR S1 S2 WNl No murmur or rub or gallop    Abd soft BS normal No tenderness no masses    R leg BKA no tenderness or discharge     IV site no erythema tenderness or discharge    CNS AAO X 3 no focal  Lab Data:                          11.4   11.02 )-----------( 239      ( 07 Sep 2021 07:07 )             36.4     WBC Count: 11.02 (09-07-21 @ 07:07)  WBC Count: 11.76 (09-06-21 @ 06:04)  WBC Count: 18.42 (09-03-21 @ 06:59)  WBC Count: 16.37 (09-02-21 @ 06:28)  WBC Count: 14.19 (09-01-21 @ 08:05)    09-06    140  |  105  |  22  ----------------------------<  149<H>  3.9   |  21<L>  |  0.86    Ca    9.3      06 Sep 2021 06:04          Culture - Body Fluid with Gram Stain (collected 03 Sep 2021 10:08)  Source: .Body Fluid right parotid gland  Gram Stain (03 Sep 2021 14:20):    Rare polymorphonuclear leukocytes seen per low power field    No organisms seen per oil power field  Preliminary Report (05 Sep 2021 13:07):    Few Klebsiella pneumoniae ESBL    Rare Candida albicans  Organism: Klebsiella pneumoniae ESBL (05 Sep 2021 13:06)  Organism: Klebsiella pneumoniae ESBL (05 Sep 2021 13:06)      -  Amikacin: S <=16      -  Amoxicillin/Clavulanic Acid: S <=8/4      -  Ampicillin: R >16 These ampicillin results predict results for amoxicillin      -  Ampicillin/Sulbactam: R >16/8 Enterobacter, Citrobacter, and Serratia may develop resistance during prolonged therapy (3-4 days)      -  Aztreonam: R >16      -  Cefazolin: R >16 Enterobacter, Citrobacter, and Serratia may develop resistance during prolonged therapy (3-4 days)      -  Cefepime: R >16      -  Cefoxitin: S <=8      -  Ceftriaxone: R >32 Enterobacter, Citrobacter, and Serratia may develop resistance during prolonged therapy      -  Ciprofloxacin: I 0.5      -  Ertapenem: S <=0.5      -  Gentamicin: R >8      -  Levofloxacin: S <=0.5      -  Meropenem: S <=1      -  Piperacillin/Tazobactam: R <=8      -  Tobramycin: R >8      -  Trimethoprim/Sulfamethoxazole: R >2/38      Method Type: JOANN    Culture - Abscess with Gram Stain (collected 02 Sep 2021 17:06)  Source: .Abscess R parotid  Final Report (07 Sep 2021 12:03):    Rare Klebsiella pneumoniae ESBL    Rare Enterococcus faecalis    Rare Rehana albicans "Susceptibilities not performed"    Few Alpha hemolytic strep "Susceptibilities not performed"  Organism: Klebsiella pneumoniae ESBL  Enterococcus faecalis (07 Sep 2021 12:03)  Organism: Enterococcus faecalis (04 Sep 2021 15:10)      -  Ampicillin: S <=2 Predicts results to ampicillin/sulbactam, amoxacillin-clavulanate and  piperacillin-tazobactam.      -  Tetra/Doxy: R >8      -  Vancomycin: S 2      Method Type: JOANN  Organism: Klebsiella pneumoniae ESBL (04 Sep 2021 09:36)      -  Amikacin: S <=16      -  Amoxicillin/Clavulanic Acid: S <=8/4      -  Ampicillin: R >16 These ampicillin results predict results for amoxicillin      -  Ampicillin/Sulbactam: R >16/8 Enterobacter, Citrobacter, and Serratia may develop resistance during prolonged therapy (3-4 days)      -  Aztreonam: R >16      -  Cefazolin: R >16 Enterobacter, Citrobacter, and Serratia may develop resistance during prolonged therapy (3-4 days)      -  Cefepime: R >16      -  Cefoxitin: S <=8      -  Ceftriaxone: R >32 Enterobacter, Citrobacter, and Serratia may develop resistance during prolonged therapy      -  Ciprofloxacin: I 0.5      -  Ertapenem: S <=0.5      -  Gentamicin: R >8      -  Imipenem: S <=1      -  Levofloxacin: S <=0.5      -  Meropenem: S <=1      -  Piperacillin/Tazobactam: R <=8      -  Tobramycin: I 8      -  Trimethoprim/Sulfamethoxazole: R >2/38      Method Type: JOANN      < from: CT Maxillofacial w/ IV Cont (09.01.21 @ 09:14) >    IMPRESSION: Tubular rim-enhancing collection within the expected course of the right parotid duct, most compatible with sialodochitis. Evaluation for obstructing radiopaque stones within the course of the right parotid duct is extremely limited secondary to streak and beam hardening artifact generated by dental hardware/amalgam. Associated adjacent cellulitis of the right side of the face.    Right sided parotiditis.    Enlarged right-sided cervical lymph nodes which are reactive.    --- End of Report ---    < end of copied text >

## 2021-09-07 NOTE — PROGRESS NOTE ADULT - SUBJECTIVE AND OBJECTIVE BOX
ENT ISSUE/POD: R parotitis and facial cellulitis      HPI: 71 yo F a R parotitis and adjacent facial cellulitis and soft tissue edema after dental work admitted for IV unasyn and decadron. Pt reports feeling improved with mild pain and swelling made better with hot packs. Pt tolerating PO, mostly soft foods. Cx back as klebsiella, ID changed abx to ertapenem. Pt w mild GI discomfort since made better with antiacids        PAST MEDICAL & SURGICAL HISTORY:  Mild HTN    HLD (hyperlipidemia)    S/P below knee amputation, left    H/O total hip arthroplasty, left      Allergies    No Known Allergies    Intolerances      MEDICATIONS  (STANDING):  ascorbic acid 500 milliGRAM(s) Oral daily  atorvastatin 40 milliGRAM(s) Oral at bedtime  calcium carbonate 1250 mG  + Vitamin D (OsCal 500 + D) 1 Tablet(s) Oral daily  cholecalciferol 1000 Unit(s) Oral daily  enoxaparin Injectable 40 milliGRAM(s) SubCutaneous daily  ertapenem  IVPB      ertapenem  IVPB 1000 milliGRAM(s) IV Intermittent every 24 hours  famotidine    Tablet 20 milliGRAM(s) Oral daily  loratadine 10 milliGRAM(s) Oral daily  NIFEdipine XL 60 milliGRAM(s) Oral daily    MEDICATIONS  (PRN):  acetaminophen   Tablet .. 650 milliGRAM(s) Oral every 6 hours PRN Mild Pain (1 - 3), Moderate Pain (4 - 6)  morphine  - Injectable 2 milliGRAM(s) IV Push every 6 hours PRN Severe Pain (7 - 10)  senna 1 Tablet(s) Oral daily PRN Constipation      Social History: see consult    Family history: see consult    ROS:   ENT: all negative except as noted in HPI   Pulm: denies SOB, cough, hemoptysis  Neuro: denies numbness/tingling, loss of sensation  Endo: denies heat/cold intolerance, excessive sweating      Vital Signs Last 24 Hrs  T(C): 36.7 (06 Sep 2021 23:58), Max: 36.9 (06 Sep 2021 19:28)  T(F): 98.1 (06 Sep 2021 23:58), Max: 98.4 (06 Sep 2021 19:28)  HR: 78 (06 Sep 2021 23:58) (68 - 94)  BP: 129/65 (06 Sep 2021 23:58) (118/59 - 147/69)  BP(mean): --  RR: 18 (06 Sep 2021 23:58) (18 - 19)  SpO2: 97% (06 Sep 2021 23:58) (94% - 97%)                          11.4   11.02 )-----------( 239      ( 07 Sep 2021 07:07 )             36.4    09-06    140  |  105  |  22  ----------------------------<  149<H>  3.9   |  21<L>  |  0.86    Ca    9.3      06 Sep 2021 06:04         PHYSICAL EXAM:  Gen: NAD  Skin: No rashes, bruises, or lesions  Head: Normocephalic, Atraumatic  Face: R parotid firm swelling with improving extension to cheek  Eyes: no scleral injection  Nose: Nares bilaterally patent, no discharge  Mouth: No Stridor / Drooling / Trismus.  Mucosa moist, tongue/uvula midline  Neck: Flat, supple, no lymphadenopathy, trachea midline, no masses  Lymphatic: No lymphadenopathy  Resp: breathing easily, no stridor  Neuro: facial nerve intact, no facial droop         ENT ISSUE/POD: R parotitis and facial cellulitis      HPI: 69 yo F a R parotitis and adjacent facial cellulitis and soft tissue edema after dental work admitted for IV unasyn and decadron. Pt reports feeling improved with mild pain and swelling made better with hot packs. Pt tolerating PO, mostly soft foods. Cx back as klebsiella, ID changed abx to ertapenem. Pt w mild GI discomfort since made better with antiacids        PAST MEDICAL & SURGICAL HISTORY:  Mild HTN    HLD (hyperlipidemia)    S/P below knee amputation, left    H/O total hip arthroplasty, left      Allergies    No Known Allergies    Intolerances      MEDICATIONS  (STANDING):  ascorbic acid 500 milliGRAM(s) Oral daily  atorvastatin 40 milliGRAM(s) Oral at bedtime  calcium carbonate 1250 mG  + Vitamin D (OsCal 500 + D) 1 Tablet(s) Oral daily  cholecalciferol 1000 Unit(s) Oral daily  enoxaparin Injectable 40 milliGRAM(s) SubCutaneous daily  ertapenem  IVPB      ertapenem  IVPB 1000 milliGRAM(s) IV Intermittent every 24 hours  famotidine    Tablet 20 milliGRAM(s) Oral daily  loratadine 10 milliGRAM(s) Oral daily  NIFEdipine XL 60 milliGRAM(s) Oral daily    MEDICATIONS  (PRN):  acetaminophen   Tablet .. 650 milliGRAM(s) Oral every 6 hours PRN Mild Pain (1 - 3), Moderate Pain (4 - 6)  morphine  - Injectable 2 milliGRAM(s) IV Push every 6 hours PRN Severe Pain (7 - 10)  senna 1 Tablet(s) Oral daily PRN Constipation      Social History: see consult    Family history: see consult    ROS:   ENT: all negative except as noted in HPI   Pulm: denies SOB, cough, hemoptysis  Neuro: denies numbness/tingling, loss of sensation  Endo: denies heat/cold intolerance, excessive sweating      Vital Signs Last 24 Hrs  T(C): 36.7 (06 Sep 2021 23:58), Max: 36.9 (06 Sep 2021 19:28)  T(F): 98.1 (06 Sep 2021 23:58), Max: 98.4 (06 Sep 2021 19:28)  HR: 78 (06 Sep 2021 23:58) (68 - 94)  BP: 129/65 (06 Sep 2021 23:58) (118/59 - 147/69)  BP(mean): --  RR: 18 (06 Sep 2021 23:58) (18 - 19)  SpO2: 97% (06 Sep 2021 23:58) (94% - 97%)                          11.4   11.02 )-----------( 239      ( 07 Sep 2021 07:07 )             36.4    09-06    140  |  105  |  22  ----------------------------<  149<H>  3.9   |  21<L>  |  0.86    Ca    9.3      06 Sep 2021 06:04         PHYSICAL EXAM:  Gen: NAD  Skin: No rashes, bruises, or lesions  Head: Normocephalic, Atraumatic  Face: R parotid firm swelling with improving extension to cheek  Eyes: no scleral injection  Nose: Nares bilaterally patent, no discharge  Mouth: Celia colored fluid expressed from Stensen"s duct, no purulence, No Stridor / Drooling / Trismus.  Mucosa moist, tongue/uvula midline  Neck: Flat, supple, no lymphadenopathy, trachea midline, no masses  Lymphatic: No lymphadenopathy  Resp: breathing easily, no stridor  Neuro: facial nerve intact, no facial droop

## 2021-09-07 NOTE — PROGRESS NOTE ADULT - ASSESSMENT
71 yo female w extensive R parotitis w adjacent soft tissue swelling and cellulitis. Pt continues to improve clinically, currently on IV unasyn, swelling decreased significantly. Brown murky fluid expressed from Stensen's duct. No stone felt. Cx grew klebsiella, abx changed to ertepenem  71 yo female w extensive R parotitis w adjacent soft tissue swelling and cellulitis. Pt continues to improve clinically, currently on IV unasyn, swelling decreased significantly. Celia colored fluid expressed from Stensen's duct, no pus. No stone felt. Cx grew klebsiella, abx changed to ertepenem

## 2021-09-07 NOTE — GOALS OF CARE CONVERSATION - ADVANCED CARE PLANNING - CONVERSATION DETAILS
This patient was seen today for goals fo care planning conversation.  Patient states not interested in discussing goals of care at this time and is only here for facial swelling.  Patient was offered follow up visit at a later time and declined.  Pt states she has her power of  and other legal documents.  She does not have on the chart and will not be able to provide them on this admission,

## 2021-09-07 NOTE — PROGRESS NOTE ADULT - SUBJECTIVE AND OBJECTIVE BOX
Patient is a 70y old  Female who presents with a chief complaint of right facial swelling (07 Sep 2021 07:44)      DATE OF SERVICE: 09-07-21 @ 12:33    SUBJECTIVE / OVERNIGHT EVENTS: overnight events noted    ROS:  Resp: No cough no sputum production  CVS: No chest pain no palpitations no orthopnea  GI: no N/V/D  : no dysuria, no hematuria  Neuro: no weakness no paresthesias          MEDICATIONS  (STANDING):  ascorbic acid 500 milliGRAM(s) Oral daily  atorvastatin 40 milliGRAM(s) Oral at bedtime  calcium carbonate 1250 mG  + Vitamin D (OsCal 500 + D) 1 Tablet(s) Oral daily  cholecalciferol 1000 Unit(s) Oral daily  enoxaparin Injectable 40 milliGRAM(s) SubCutaneous daily  ertapenem  IVPB      ertapenem  IVPB 1000 milliGRAM(s) IV Intermittent every 24 hours  famotidine    Tablet 20 milliGRAM(s) Oral daily  loratadine 10 milliGRAM(s) Oral daily  NIFEdipine XL 60 milliGRAM(s) Oral daily    MEDICATIONS  (PRN):  acetaminophen   Tablet .. 650 milliGRAM(s) Oral every 6 hours PRN Mild Pain (1 - 3), Moderate Pain (4 - 6)  morphine  - Injectable 2 milliGRAM(s) IV Push every 6 hours PRN Severe Pain (7 - 10)  senna 1 Tablet(s) Oral daily PRN Constipation        CAPILLARY BLOOD GLUCOSE        I&O's Summary    06 Sep 2021 07:01  -  07 Sep 2021 07:00  --------------------------------------------------------  IN: 300 mL / OUT: 902 mL / NET: -602 mL    07 Sep 2021 07:01  -  07 Sep 2021 12:33  --------------------------------------------------------  IN: 0 mL / OUT: 700 mL / NET: -700 mL        Vital Signs Last 24 Hrs  T(C): 36.8 (07 Sep 2021 08:12), Max: 36.9 (06 Sep 2021 19:28)  T(F): 98.2 (07 Sep 2021 08:12), Max: 98.4 (06 Sep 2021 19:28)  HR: 71 (07 Sep 2021 08:12) (71 - 94)  BP: 124/64 (07 Sep 2021 08:12) (124/64 - 147/69)  BP(mean): --  RR: 18 (07 Sep 2021 08:12) (18 - 19)  SpO2: 97% (07 Sep 2021 08:12) (94% - 97%)    PHYSICAL EXAM:   HEENT: ARACELIS EOMI  right facial firm swelling   no appreciable change  Neck: Supple, no JVD  Lungs: clear   CVS: S1 S2 no M/R/G  Abdomen: no tenderness  Neuro: AO x 3 non focal    LABS:                        11.4   11.02 )-----------( 239      ( 07 Sep 2021 07:07 )             36.4     09-06    140  |  105  |  22  ----------------------------<  149<H>  3.9   |  21<L>  |  0.86    Ca    9.3      06 Sep 2021 06:04                  All consultant(s) notes reviewed and care discussed with other providers        Contact Number, Dr Gayle [phone]

## 2021-09-07 NOTE — PROGRESS NOTE ADULT - ASSESSMENT
71 yo female with PMH of HTN, HLD, PAD s/p Left BKA p/w right facial swelling.  Patient reports that she has been having dental work over the past few weeks.  Everything had been going well up until last week.  Patient was seen by her dentist again last Tuesday, two days later she noticed right facial swelling.  Went back to her dentist who sent her to an oral surgeon yesterday.  Oral surgeon recommended patient come to the Emergency Department.   R parotitis  ? sialadenitis  ? abscess vs swelling with inspisated secretions  Leucocytosis though also on steroids  ESBL kleb and candida (and strep+anaerobes on 1 Cx)  On Invanz day 4  clinically stable though feels worse today  also periorbital numbness though no swelling seen  rec:  A) Parotitis  atypical organisms  on invanz  Check repeat imaging with CT  ENT follow up  continue Invanz + fluconazole for now  isolate is S to levaquin-later may be able to de-escalate to levaquin+/- augmentin   Option of midline discussed with pt as well-she is hesistant  Short fluconazole course  monitor Qtc      B) leucocytosis  from above + recd steroids  improving  plana s above    Will tailor plan for ID issues  per course,results.Will defer to primary team on management of other issues.  Assessment, plan and recommendations as detailed above were discussed with the medical/primary  team.  I am away tomorrow.My colleagues in ID service will cover .Please call [phone] if any issues or questions.

## 2021-09-08 ENCOUNTER — TRANSCRIPTION ENCOUNTER (OUTPATIENT)
Age: 71
End: 2021-09-08

## 2021-09-08 VITALS — HEIGHT: 62 IN | WEIGHT: 200.62 LBS

## 2021-09-08 LAB
CULTURE RESULTS: SIGNIFICANT CHANGE UP
HCT VFR BLD CALC: 37.5 % — SIGNIFICANT CHANGE UP (ref 34.5–45)
HGB BLD-MCNC: 12 G/DL — SIGNIFICANT CHANGE UP (ref 11.5–15.5)
MCHC RBC-ENTMCNC: 26.6 PG — LOW (ref 27–34)
MCHC RBC-ENTMCNC: 32 GM/DL — SIGNIFICANT CHANGE UP (ref 32–36)
MCV RBC AUTO: 83.1 FL — SIGNIFICANT CHANGE UP (ref 80–100)
NRBC # BLD: 0 /100 WBCS — SIGNIFICANT CHANGE UP (ref 0–0)
ORGANISM # SPEC MICROSCOPIC CNT: SIGNIFICANT CHANGE UP
ORGANISM # SPEC MICROSCOPIC CNT: SIGNIFICANT CHANGE UP
PLATELET # BLD AUTO: 238 K/UL — SIGNIFICANT CHANGE UP (ref 150–400)
RBC # BLD: 4.51 M/UL — SIGNIFICANT CHANGE UP (ref 3.8–5.2)
RBC # FLD: 15.8 % — HIGH (ref 10.3–14.5)
SARS-COV-2 RNA SPEC QL NAA+PROBE: SIGNIFICANT CHANGE UP
SPECIMEN SOURCE: SIGNIFICANT CHANGE UP
WBC # BLD: 10.82 K/UL — HIGH (ref 3.8–10.5)
WBC # FLD AUTO: 10.82 K/UL — HIGH (ref 3.8–10.5)

## 2021-09-08 PROCEDURE — 82803 BLOOD GASES ANY COMBINATION: CPT

## 2021-09-08 PROCEDURE — 97161 PT EVAL LOW COMPLEX 20 MIN: CPT

## 2021-09-08 PROCEDURE — 85025 COMPLETE CBC W/AUTO DIFF WBC: CPT

## 2021-09-08 PROCEDURE — 85018 HEMOGLOBIN: CPT

## 2021-09-08 PROCEDURE — 70487 CT MAXILLOFACIAL W/DYE: CPT | Mod: MG

## 2021-09-08 PROCEDURE — 96368 THER/DIAG CONCURRENT INF: CPT

## 2021-09-08 PROCEDURE — 82947 ASSAY GLUCOSE BLOOD QUANT: CPT

## 2021-09-08 PROCEDURE — 83605 ASSAY OF LACTIC ACID: CPT

## 2021-09-08 PROCEDURE — 93005 ELECTROCARDIOGRAM TRACING: CPT

## 2021-09-08 PROCEDURE — G1004: CPT

## 2021-09-08 PROCEDURE — 36415 COLL VENOUS BLD VENIPUNCTURE: CPT

## 2021-09-08 PROCEDURE — 85014 HEMATOCRIT: CPT

## 2021-09-08 PROCEDURE — 86769 SARS-COV-2 COVID-19 ANTIBODY: CPT

## 2021-09-08 PROCEDURE — 82565 ASSAY OF CREATININE: CPT

## 2021-09-08 PROCEDURE — 87205 SMEAR GRAM STAIN: CPT

## 2021-09-08 PROCEDURE — 99285 EMERGENCY DEPT VISIT HI MDM: CPT

## 2021-09-08 PROCEDURE — 70491 CT SOFT TISSUE NECK W/DYE: CPT

## 2021-09-08 PROCEDURE — 83036 HEMOGLOBIN GLYCOSYLATED A1C: CPT

## 2021-09-08 PROCEDURE — 85027 COMPLETE CBC AUTOMATED: CPT

## 2021-09-08 PROCEDURE — 82330 ASSAY OF CALCIUM: CPT

## 2021-09-08 PROCEDURE — 87186 SC STD MICRODIL/AGAR DIL: CPT

## 2021-09-08 PROCEDURE — 84443 ASSAY THYROID STIM HORMONE: CPT

## 2021-09-08 PROCEDURE — 87070 CULTURE OTHR SPECIMN AEROBIC: CPT

## 2021-09-08 PROCEDURE — 99232 SBSQ HOSP IP/OBS MODERATE 35: CPT

## 2021-09-08 PROCEDURE — 86803 HEPATITIS C AB TEST: CPT

## 2021-09-08 PROCEDURE — 84295 ASSAY OF SERUM SODIUM: CPT

## 2021-09-08 PROCEDURE — 87040 BLOOD CULTURE FOR BACTERIA: CPT

## 2021-09-08 PROCEDURE — 84132 ASSAY OF SERUM POTASSIUM: CPT

## 2021-09-08 PROCEDURE — 96365 THER/PROPH/DIAG IV INF INIT: CPT

## 2021-09-08 PROCEDURE — 87077 CULTURE AEROBIC IDENTIFY: CPT

## 2021-09-08 PROCEDURE — 82435 ASSAY OF BLOOD CHLORIDE: CPT

## 2021-09-08 PROCEDURE — 87075 CULTR BACTERIA EXCEPT BLOOD: CPT

## 2021-09-08 PROCEDURE — U0003: CPT

## 2021-09-08 PROCEDURE — 80048 BASIC METABOLIC PNL TOTAL CA: CPT

## 2021-09-08 PROCEDURE — U0005: CPT

## 2021-09-08 PROCEDURE — 80053 COMPREHEN METABOLIC PANEL: CPT

## 2021-09-08 RX ORDER — ZINC SULFATE TAB 220 MG (50 MG ZINC EQUIVALENT) 220 (50 ZN) MG
0 TAB ORAL
Qty: 0 | Refills: 0 | DISCHARGE

## 2021-09-08 RX ORDER — LEVOFLOXACIN 5 MG/ML
1 INJECTION, SOLUTION INTRAVENOUS
Qty: 10 | Refills: 0
Start: 2021-09-08 | End: 2021-09-17

## 2021-09-08 RX ORDER — ACETAMINOPHEN 500 MG
2 TABLET ORAL
Qty: 0 | Refills: 0 | DISCHARGE
Start: 2021-09-08

## 2021-09-08 RX ORDER — FLUCONAZOLE 150 MG/1
1 TABLET ORAL
Qty: 5 | Refills: 0
Start: 2021-09-08 | End: 2021-09-12

## 2021-09-08 RX ORDER — DOCUSATE SODIUM 100 MG
0 CAPSULE ORAL
Qty: 0 | Refills: 0 | DISCHARGE

## 2021-09-08 RX ORDER — GLUCOSAMINE/MSM/CHONDROITIN A 750-375MG
0 TABLET ORAL
Qty: 0 | Refills: 0 | DISCHARGE

## 2021-09-08 RX ORDER — CHOLECALCIFEROL (VITAMIN D3) 125 MCG
0 CAPSULE ORAL
Qty: 0 | Refills: 0 | DISCHARGE

## 2021-09-08 RX ORDER — CHOLECALCIFEROL (VITAMIN D3) 125 MCG
1000 CAPSULE ORAL
Qty: 0 | Refills: 0 | DISCHARGE
Start: 2021-09-08

## 2021-09-08 RX ORDER — ECHINACEA PURPUREA EXTRACT 125 MG
0 TABLET ORAL
Qty: 0 | Refills: 0 | DISCHARGE

## 2021-09-08 RX ADMIN — LORATADINE 10 MILLIGRAM(S): 10 TABLET ORAL at 12:31

## 2021-09-08 RX ADMIN — FAMOTIDINE 20 MILLIGRAM(S): 10 INJECTION INTRAVENOUS at 12:31

## 2021-09-08 RX ADMIN — Medication 60 MILLIGRAM(S): at 07:39

## 2021-09-08 RX ADMIN — Medication 500 MILLIGRAM(S): at 12:31

## 2021-09-08 RX ADMIN — ERTAPENEM SODIUM 120 MILLIGRAM(S): 1 INJECTION, POWDER, LYOPHILIZED, FOR SOLUTION INTRAMUSCULAR; INTRAVENOUS at 12:31

## 2021-09-08 RX ADMIN — Medication 1000 UNIT(S): at 12:31

## 2021-09-08 RX ADMIN — FLUCONAZOLE 100 MILLIGRAM(S): 150 TABLET ORAL at 12:31

## 2021-09-08 RX ADMIN — ENOXAPARIN SODIUM 40 MILLIGRAM(S): 100 INJECTION SUBCUTANEOUS at 12:31

## 2021-09-08 RX ADMIN — Medication 1 TABLET(S): at 12:31

## 2021-09-08 NOTE — PROGRESS NOTE ADULT - ATTENDING COMMENTS
initially unable to express purulence out of the duct, but later in the afternoon after decadron, purulence now being expressed.  Culture sent.  F/up culture.  No stone able to be palpated due to induration.    CT reviewed - suspect there is a stone somewhere in the duct which is causing diffuse enlargement of the duct with static saliva/purulence.  Do not think she has an abscess.    decadron x 24 hours  unasyn for now, f/up cx results.   conservative measures as above.
Right acute parotitis, discussed treatment with pt - abx and salivary precautions/ massage will follow for resolution
much improved from yesterday, induration around stensens duct is improving, no stone palpated.  second culture sent today from purulence.   c/w current care, dc decadron.
much improved, now milking clear saliva but with yellow tinge - no further purulence.  Complete 7-10 day course of abx, defer to ID re: which abx and length.  Likely will need sialendoscopy in the future.  Should f/up with Dr. Esvin Stein upon discharge as he performs sialendoscopy.

## 2021-09-08 NOTE — PROGRESS NOTE ADULT - PROVIDER SPECIALTY LIST ADULT
ENT
Internal Medicine
ENT
ENT
Infectious Disease
ENT
Internal Medicine

## 2021-09-08 NOTE — PROGRESS NOTE ADULT - REASON FOR ADMISSION
right facial swelling

## 2021-09-08 NOTE — PROGRESS NOTE ADULT - PROBLEM SELECTOR PLAN 1
Admit to medicine for additional IV abx and steroid tx  -Additional 10 mg decadron q 8 hours for 3 more doses  -IV unasyn  -Warm compress to R face at least 4 times daily  -PO intake encouraged, soft diet as tolerated  -Adequate hydration  -Sialogogues (sour candies, lemon etc.) to stimulate salivary flow  -Will work to obtain culture if pus expressed from stensons duct   -Pain control.
repeat CT shows vastly improved appearance of parotid  will switch to po meds as per ID and discharge  follow up with ENT and ID as outpatient   continue local massaging and sialogogue use at home
-IV unasyn  -DC decadron   -Warm compress to R face at least 4 times daily  -PO intake encouraged, soft diet as tolerated  -Adequate hydration  -Sialogogues (sour candies, lemon etc.) to stimulate salivary flow  -Pain control.  -ID on board
·  Problem: Parotitis.   ·  Plan: Admit to medicine for additional IV abx and steroid tx  -Additional 10 mg decadron q 8 hours for 3 more doses  -IV unasyn  -Warm compress to R face at least 4 times daily  -PO intake encouraged, soft diet as tolerated  -Adequate hydration  -Sialogogues (sour candies, lemon etc.) to stimulate salivary flow  -Will work to obtain culture if pus expressed from stensons duct   -Pain control.
-IV unasyn changed to ertepenem for klebsiella  -DC decadron   -Warm compress to R face at least 4 times daily  -PO intake encouraged, soft diet as tolerated  -Adequate hydration  -Sialogogues (sour candies, lemon etc.) to stimulate salivary flow  -Pain control.  -ID on board.
-IV unasyn changed to ertepenem for klebsiella  -decadron course completed  -Warm compress to R face at least 4 times daily  -PO intake encouraged, soft diet as tolerated  -Adequate hydration  -Sialogogues (sour candies, lemon etc.) to stimulate salivary flow  -Pain control.  -ID on board.
cultures positive for ESBL Klebsiella  continue Ertapenem   continue IV antibiotics next 2 - 3 days per ID  c/o reflux symptoms   already on Pepcid  pantoprazole x 1 dose IV
-IV unasyn changed to ertepenem for klebsiella  - decadron  course completed  -Warm compress to R face at least 4 times daily  -PO intake encouraged, soft diet as tolerated  -Adequate hydration  -Sialogogues (sour candies, lemon etc.) to stimulate salivary flow  -Pain control.  -ID on board.
cultures positive for ESBL Klebsiella  continue Ertapenem   continue IV antibiotics   repeat CT today to r/o abscess
cultures positive for ESBL Klebsiella  continue Ertapenem   off dexamethasone   continue IV antibiotics next 2 - 3 days per ID
·  Problem: Parotitis.   ·  Plan: -IV unasyn changed to ertepenem for klebsiella  -decadron course completed  -Warm compress to R face at least 4 times daily  -PO intake encouraged, soft diet as tolerated  -Adequate hydration  -Sialogogues (sour candies, lemon etc.) to stimulate salivary flow  -Pain control.  -ID on board.  -F/U with ENT as outpt
cultures positive for Klebsiella ESBL  changed to Ertapenem by ID  s/p dexamethasone with some clinical improvement   discontinue dexamethasone   continued improvement overnight  continue to follow ENT recommendations
will continue Unasyn for now  ID help appreciated  Klebsiella sp growing in culture  s/p dexamethasone with some clinical improvement   discussed with ENT service they want to continue dexamethasone 10 q 8 till tomorrow  cleared with ID

## 2021-09-08 NOTE — PROGRESS NOTE ADULT - SUBJECTIVE AND OBJECTIVE BOX
Patient is a 70y old  Female who presents with a chief complaint of right facial swelling (08 Sep 2021 08:07)      DATE OF SERVICE: 09-08-21 @ 13:05    SUBJECTIVE / OVERNIGHT EVENTS: overnight events noted    ROS:  Resp: No cough no sputum production  CVS: No chest pain no palpitations no orthopnea  GI: no N/V/D  : no dysuria, no hematuria  Neuro: no weakness no paresthesias        MEDICATIONS  (STANDING):  ascorbic acid 500 milliGRAM(s) Oral daily  atorvastatin 40 milliGRAM(s) Oral at bedtime  calcium carbonate 1250 mG  + Vitamin D (OsCal 500 + D) 1 Tablet(s) Oral daily  cholecalciferol 1000 Unit(s) Oral daily  enoxaparin Injectable 40 milliGRAM(s) SubCutaneous daily  ertapenem  IVPB      ertapenem  IVPB 1000 milliGRAM(s) IV Intermittent every 24 hours  famotidine    Tablet 20 milliGRAM(s) Oral daily  fluconAZOLE   Tablet 100 milliGRAM(s) Oral daily  loratadine 10 milliGRAM(s) Oral daily  NIFEdipine XL 60 milliGRAM(s) Oral daily    MEDICATIONS  (PRN):  acetaminophen   Tablet .. 650 milliGRAM(s) Oral every 6 hours PRN Mild Pain (1 - 3), Moderate Pain (4 - 6)  morphine  - Injectable 2 milliGRAM(s) IV Push every 6 hours PRN Severe Pain (7 - 10)  senna 1 Tablet(s) Oral daily PRN Constipation        CAPILLARY BLOOD GLUCOSE        I&O's Summary    07 Sep 2021 07:01  -  08 Sep 2021 07:00  --------------------------------------------------------  IN: 300 mL / OUT: 1575 mL / NET: -1275 mL        Vital Signs Last 24 Hrs  T(C): 37 (08 Sep 2021 08:06), Max: 37 (08 Sep 2021 08:06)  T(F): 98.6 (08 Sep 2021 08:06), Max: 98.6 (08 Sep 2021 08:06)  HR: 86 (08 Sep 2021 08:06) (78 - 86)  BP: 123/66 (08 Sep 2021 08:06) (123/66 - 146/67)  BP(mean): --  RR: 20 (08 Sep 2021 08:06) (18 - 20)  SpO2: 97% (08 Sep 2021 08:06) (96% - 97%)    PHYSICAL EXAM:   HEENT: ARACELIS EOMI  right facial firm swelling   no appreciable change  Neck: Supple, no JVD  Lungs: clear   CVS: S1 S2 no M/R/G  Abdomen: no tenderness  Neuro: AO x 3 non focal    LABS:                        12.0   10.82 )-----------( 238      ( 08 Sep 2021 07:28 )             37.5                       All consultant(s) notes reviewed and care discussed with other providers        Contact Number, Dr Gayle [phone]

## 2021-09-08 NOTE — DISCHARGE NOTE PROVIDER - CARE PROVIDER_API CALL
Mina Garduno)  Infectious Disease; Internal Medicine  [address] [place] [address]  Phone: [phone]  Fax: [phone]  Established Patient  Follow Up Time: 2 weeks    Esvin Stein)  Otolaryngology  [address] [place] [address] [address]  [place] [address]  Phone: [phone]  Fax: [phone]  Follow Up Time: 2 weeks

## 2021-09-08 NOTE — DISCHARGE NOTE PROVIDER - HOSPITAL COURSE
69 yo female with PMH of HTN, HLD, PAD s/p Left BKA p/w right facial swelling.  Patient reports that she has been having dental work over the past few weeks.  Everything had been going well up until last week.  Patient was seen by her dentist again last Tuesday, two days later she noticed right facial swelling.  Went back to her dentist who sent her to an oral surgeon yesterday.  Oral surgeon recommended patient come to the Emergency Department. Patient has a ct scan which show Right sided parotiditis. Patient was seen by ENT and Infectious disease . Body fluid was sent from Rt paratid gland  to lab and grew Klebsiella and candida .  She was treated with intravenous antibiotics, steroids and  flucanazole.  Patient had a repeat ct scan of head and show improvement in Rt paratotiditis . Patient is now medically stable for discharge and will follow up with Dr Mclean and ENT Dr. Esvin Stein.

## 2021-09-08 NOTE — PROVIDER CONTACT NOTE (CRITICAL VALUE NOTIFICATION) - ACTION/TREATMENT ORDERED:
MARIA DEL CARMEN Lisset notified, already aware. No interventions at this time. PT awaiting d/c home with PO ABX.

## 2021-09-08 NOTE — PROGRESS NOTE ADULT - PROBLEM SELECTOR PLAN 4
supportive care  fall  precautions   DVT prophylaxis
no intervention required at this time  fall precautions  PT evaluation  DVT prophylaxis
supportive care  fall  precautions   DVT prophylaxis
supportive care  fall  precautions   DVT prophylaxis

## 2021-09-08 NOTE — DISCHARGE NOTE PROVIDER - NSDCMRMEDTOKEN_GEN_ALL_CORE_FT
Problem: Fall Risk (Adult)  Goal: Absence of Falls  Outcome: Ongoing (interventions implemented as appropriate)    07/09/17 1633   Fall Risk (Adult)   Absence of Falls making progress toward outcome            Calcium 500+D:   Claritin 24 Hour Allergy 10 mg oral tablet: 1 tab(s) orally once a day  Colace 100 mg oral capsule: orally every other day  Crestor 10 mg oral tablet: 1 tab(s) orally once a day  Echinacea oral tablet:   elderberry:   ginger oral capsule:   Glucosamine &amp; Chondroitin with MSM oral tablet:   NIFEdipine 60 mg oral tablet, extended release: 1 tab(s) orally once a day  Pepcid 20 mg oral tablet: 1 tab(s) orally 2 times a day, As Needed  Senna 8.6 mg oral tablet: orally every other day  Vitamin C:   Vitamin D3:   Zinc:    acetaminophen 325 mg oral tablet: 2 tab(s) orally every 6 hours, As needed, Mild Pain (1 - 3), Moderate Pain (4 - 6)  Augmentin 875 mg-125 mg oral tablet: 1  orally 2 times a day   Calcium 500+D:   cholecalciferol oral tablet: 1000 unit(s) orally once a day  Claritin 24 Hour Allergy 10 mg oral tablet: 1 tab(s) orally once a day  Crestor 10 mg oral tablet: 1 tab(s) orally once a day  fluconazole 100 mg oral tablet: 1 tab(s) orally once a day  levoFLOXacin 500 mg oral tablet: 1 tab(s) orally once a day   NIFEdipine 60 mg oral tablet, extended release: 1 tab(s) orally once a day  Pepcid 20 mg oral tablet: 1 tab(s) orally 2 times a day, As Needed  Senna 8.6 mg oral tablet: orally every other day  Vitamin C:

## 2021-09-08 NOTE — PROGRESS NOTE ADULT - ASSESSMENT
69 yo female w extensive R parotitis w adjacent soft tissue swelling and cellulitis. Pt continues to improve clinically, currently on IV unasyn, swelling decreased significantly. Celia colored fluid expressed from Stensen's duct, no pus. No stone felt. Cx grew klebsiella, abx changed to ertepenem. WBC 10.82

## 2021-09-08 NOTE — PROGRESS NOTE ADULT - NSPROGADDITIONALINFOA_GEN_ALL_CORE
discussed with patient in detail, expresses understanding of treatment plans.  discussed with ID
discussed with patient in detail, expresses understanding of treatment plans.
discussed with patient in detail, expresses understanding of treatment plans.
likely discharge home tomorrow
discussed with patient in detail, expresses understanding of treatment plans.
discussed with patient in detail, expresses understanding of treatment plans.

## 2021-09-08 NOTE — DISCHARGE NOTE PROVIDER - CARE PROVIDERS DIRECT ADDRESSES
[email or web address]

## 2021-09-08 NOTE — PROGRESS NOTE ADULT - ASSESSMENT
69 yo female with PMH of HTN, HLD, PAD s/p Left BKA p/w right facial swelling.  Patient reports that she has been having dental work over the past few weeks.  Everything had been going well up until last week.  Patient was seen by her dentist again last Tuesday, two days later she noticed right facial swelling.  Went back to her dentist who sent her to an oral surgeon yesterday.  Oral surgeon recommended patient come to the Emergency Department.   R parotitis  ? sialadenitis  ? abscess vs swelling with inspisated secretions  Leucocytosis though also on steroids  ESBL kleb and candida (and strep+anaerobes on 1 Cx)  On Invanz day 5  clinically stable though feels worse today  also periorbital numbness though no swelling seen  rec:  A) Parotitis  atypical organisms  on invanz  ENT follow up  isolate is S to levaquin - utilize levaquin + augmentin on DC  Option of midline discussed with pt as well-she is hesistant  Short fluconazole course  monitor Qtc    B) leucocytosis  from above + recd steroids  improving  plana s above    I will be away tomorrow. Please contact the Infectious Diseases Office to contact the covering Infectious Diseases Attending.     Scott Richards M.D.  [facility] Division of Infectious Disease  8AM-5PM: Pager Number [phone]  After Hours (or if no response): Please contact the Infectious Diseases Office at [phone]

## 2021-09-08 NOTE — PROGRESS NOTE ADULT - PROBLEM SELECTOR PLAN 2
continue procardia XL 60 qd  acceptable
continue procardia XL 60 qd on discharge

## 2021-09-08 NOTE — DISCHARGE NOTE PROVIDER - PROVIDER TOKENS
PROVIDER:[TOKEN:[[id number]],FOLLOWUP:[2 weeks],ESTABLISHEDPATIENT:[T]],PROVIDER:[TOKEN:[[id number]],FOLLOWUP:[2 weeks]]

## 2021-09-08 NOTE — DISCHARGE NOTE PROVIDER - NSDCCPCAREPLAN_GEN_ALL_CORE_FT
PRINCIPAL DISCHARGE DIAGNOSIS  Diagnosis: Parotitis  Assessment and Plan of Treatment: 69 yo female with PMH of HTN, HLD, PAD s/p Left BKA p/w right facial swelling.  Patient reports that she has been having dental work over the past few weeks.  Everything had been going well up until last week.  Patient was seen by her dentist again last Tuesday, two days later she noticed right facial swelling.  Went back to her dentist who sent her to an oral surgeon yesterday.  Oral surgeon recommended patient come to the Emergency Department. Patient has a ct scan which show Right sided parotiditis. Patient was seen by ENT and Infectious disease . Body fluid was sent from Rt paratid gland  to lab and grew Klebsiella and candida .  She was treated with intravenous antibiotics, steroids and  flucanazole.  Patient had a repeat ct scan of head and show improvement in Rt paratotiditis . Patient is now medically stable for discharge and will follow up with Dr Mclean and ENT Dr. Esvin Stein.          PRINCIPAL DISCHARGE DIAGNOSIS  Diagnosis: Parotitis  Assessment and Plan of Treatment: 69 yo female with PMH of HTN, HLD, PAD s/p Left BKA p/w right facial swelling.  Patient reports that she has been having dental work over the past few weeks.  Everything had been going well up until last week.  Patient was seen by her dentist again last Tuesday, two days later she noticed right facial swelling.  Went back to her dentist who sent her to an oral surgeon yesterday.  Oral surgeon recommended patient come to the Emergency Department. Patient has a ct scan which show Right sided parotiditis. Patient was seen by ENT and Infectious disease . Body fluid was sent from Rt paratid gland  to lab and grew Klebsiella and candida .  She was treated with intravenous antibiotics, steroids and  flucanazole.  Patient had a repeat ct scan of head and show improvement in Rt paratotiditis . Patient is now medically stable for discharge and will follow up with Dr Mclaughlin and ENT Dr. Esvin Stein. Please continue antbiotic as ordered . MUST FOLLOW UP WITH DR MCLAUGHLIN        SECONDARY DISCHARGE DIAGNOSES  Diagnosis: S/P BKA (below knee amputation), left  Assessment and Plan of Treatment: SUPPORTIVE CARE.    Diagnosis: HLD (hyperlipidemia)  Assessment and Plan of Treatment: C/W CRESTOR

## 2021-09-08 NOTE — PROGRESS NOTE ADULT - SUBJECTIVE AND OBJECTIVE BOX
ENT ISSUE/POD: R parotitis and facial cellulitis      HPI: 69 yo F a R parotitis and adjacent facial cellulitis and soft tissue edema after dental work admitted for IV unasyn and decadron. Pt reports feeling improved with mild pain and swelling made better with hot packs. Pt tolerating PO, mostly soft foods. Cx back as klebsiella, ID changed abx to ertapenem. Pt w mild GI discomfort since made better with antiacids.        PAST MEDICAL & SURGICAL HISTORY:  Mild HTN    HLD (hyperlipidemia)    S/P below knee amputation, left    H/O total hip arthroplasty, left      Allergies    No Known Allergies    Intolerances      MEDICATIONS  (STANDING):  ascorbic acid 500 milliGRAM(s) Oral daily  atorvastatin 40 milliGRAM(s) Oral at bedtime  calcium carbonate 1250 mG  + Vitamin D (OsCal 500 + D) 1 Tablet(s) Oral daily  cholecalciferol 1000 Unit(s) Oral daily  enoxaparin Injectable 40 milliGRAM(s) SubCutaneous daily  ertapenem  IVPB      ertapenem  IVPB 1000 milliGRAM(s) IV Intermittent every 24 hours  famotidine    Tablet 20 milliGRAM(s) Oral daily  fluconAZOLE   Tablet 100 milliGRAM(s) Oral daily  loratadine 10 milliGRAM(s) Oral daily  NIFEdipine XL 60 milliGRAM(s) Oral daily    MEDICATIONS  (PRN):  acetaminophen   Tablet .. 650 milliGRAM(s) Oral every 6 hours PRN Mild Pain (1 - 3), Moderate Pain (4 - 6)  morphine  - Injectable 2 milliGRAM(s) IV Push every 6 hours PRN Severe Pain (7 - 10)  senna 1 Tablet(s) Oral daily PRN Constipation      Social History: see consult    Family history: see consult    ROS:   ENT: all negative except as noted in HPI   Pulm: denies SOB, cough, hemoptysis  Neuro: denies numbness/tingling, loss of sensation  Endo: denies heat/cold intolerance, excessive sweating      Vital Signs Last 24 Hrs  T(C): 37 (08 Sep 2021 08:06), Max: 37 (08 Sep 2021 08:06)  T(F): 98.6 (08 Sep 2021 08:06), Max: 98.6 (08 Sep 2021 08:06)  HR: 86 (08 Sep 2021 08:06) (71 - 86)  BP: 123/66 (08 Sep 2021 08:06) (123/66 - 146/67)  BP(mean): --  RR: 20 (08 Sep 2021 08:06) (18 - 20)  SpO2: 97% (08 Sep 2021 08:06) (96% - 97%)                          12.0   10.82 )-----------( 238      ( 08 Sep 2021 07:28 )             37.5             PHYSICAL EXAM:  Gen: NAD  Skin: No rashes, bruises, or lesions  Head: Normocephalic, Atraumatic  Face: R parotid firm swelling with improving extension to cheek  Eyes: no scleral injection  Nose: Nares bilaterally patent, no discharge  Mouth: Celia colored fluid expressed from Stensen"s duct, no purulence, No Stridor / Drooling / Trismus.  Mucosa moist, tongue/uvula midline  Neck: Flat, supple, no lymphadenopathy, trachea midline, no masses  Lymphatic: No lymphadenopathy  Resp: breathing easily, no stridor  Neuro: facial nerve intact, no facial droop

## 2021-09-08 NOTE — PROGRESS NOTE ADULT - SUBJECTIVE AND OBJECTIVE BOX
Follow Up:  parotitis    Interval History: afebrile. continued improvement in pain at side of face.     REVIEW OF SYSTEMS  [  ] ROS unobtainable because:    [ x ] All other systems negative except as noted below    Constitutional:  [ ] fever [ ] chills  [ ] weight loss  [ ] weakness  Skin:  [ ] rash [ ] phlebitis	  Eyes: [ ] icterus [ ] pain  [ ] discharge	  ENMT: [ ] sore throat  [ ] thrush [ ] ulcers [ ] exudates +right sided facial pain   Respiratory: [ ] dyspnea [ ] hemoptysis [ ] cough [ ] sputum	  Cardiovascular:  [ ] chest pain [ ] palpitations [ ] edema	  Gastrointestinal:  [ ] nausea [ ] vomiting [ ] diarrhea [ ] constipation [ ] pain	  Genitourinary:  [ ] dysuria [ ] frequency [ ] hematuria [ ] discharge [ ] flank pain  [ ] incontinence  Musculoskeletal:  [ ] myalgias [ ] arthralgias [ ] arthritis  [ ] back pain  Neurological:  [ ] headache [ ] seizures  [ ] confusion/altered mental status    Allergies  No Known Allergies        ANTIMICROBIALS:  ertapenem  IVPB    ertapenem  IVPB 1000 every 24 hours  fluconAZOLE   Tablet 100 daily      OTHER MEDS:  MEDICATIONS  (STANDING):  acetaminophen   Tablet .. 650 every 6 hours PRN  atorvastatin 40 at bedtime  enoxaparin Injectable 40 daily  famotidine    Tablet 20 daily  loratadine 10 daily  morphine  - Injectable 2 every 6 hours PRN  NIFEdipine XL 60 daily  senna 1 daily PRN      Vital Signs Last 24 Hrs  T(C): 37 (08 Sep 2021 08:06), Max: 37 (08 Sep 2021 08:06)  T(F): 98.6 (08 Sep 2021 08:06), Max: 98.6 (08 Sep 2021 08:06)  HR: 86 (08 Sep 2021 08:06) (78 - 86)  BP: 123/66 (08 Sep 2021 08:06) (123/66 - 146/67)  BP(mean): --  RR: 20 (08 Sep 2021 08:06) (18 - 20)  SpO2: 97% (08 Sep 2021 08:06) (96% - 97%)    PHYSICAL EXAMINATION:  General: Alert and Awake, NAD  HEENT: PERRL, EOMI, R Parotid swelling and tenderness  Cardiac: RRR, No M/R/G  Resp: CTAB, No Wh/Rh/Ra  Abdomen: NBS, NT/ND, No HSM, No rigidity or guarding  MSK: No LE edema. No Calf tenderness  : No olmos  Skin: No rashes or lesions. Skin is warm and dry to the touch.   Neuro: Alert and Awake. CN 2-12 Grossly intact. Moves all four extremities spontaneously.  Psych: Calm, Pleasant, Cooperative                          12.0   10.82 )-----------( 238      ( 08 Sep 2021 07:28 )             37.5                   MICROBIOLOGY:  v  .Body Fluid right parotid gland  09-03-21   Few Klebsiella pneumoniae ESBL  Rare Candida albicans "Susceptibilities not performed"  --  Klebsiella pneumoniae ESBL      .Abscess R parotid  09-02-21   Rare Klebsiella pneumoniae ESBL  Rare Enterococcus faecalis  Rare Candida albicans "Susceptibilities not performed"  Few Alpha hemolytic strep "Susceptibilities not performed"  --  Klebsiella pneumoniae ESBL  Enterococcus faecalis      .Blood Blood-Venous  09-01-21   No Growth Final  --  --      .Blood Blood-Venous  09-01-21   No Growth Final  --  --      RADIOLOGY:    <The imaging below has been reviewed and visualized by me independently. Findings as detailed in report below>    < from: CT Neck Soft Tissue w/ IV Cont (09.07.21 @ 16:34) >  IMPRESSION: Interval marked improvement of previously seen right-sided sialodochitis and right-sided parotiditis. Residual right-sided parotiditis and sialodochitis remain.    Reactive right-sided cervical lymphadenopathy.    < end of copied text >

## 2021-09-08 NOTE — DISCHARGE NOTE NURSING/CASE MANAGEMENT/SOCIAL WORK - NSDCPEFALRISK_GEN_ALL_CORE
For information on Fall & injury Prevention, visit [email or web address]

## 2021-09-08 NOTE — PROVIDER CONTACT NOTE (CRITICAL VALUE NOTIFICATION) - BACKGROUND
Pt admitted for parotitis and sialoadenitis. Previously having dental work done. PMHx HTN, HLC, s/p Left BKA.

## 2021-09-08 NOTE — DISCHARGE NOTE NURSING/CASE MANAGEMENT/SOCIAL WORK - PATIENT PORTAL LINK FT
You can access the FollowMyHealth Patient Portal offered by [facility] by registering at the following website: [email or web address]. By joining [employer]’s FollowMyHealth portal, you will also be able to view your health information using other applications (apps) compatible with our system.

## 2022-07-08 NOTE — H&P ADULT - PROBLEM SELECTOR PLAN 2
Nutrition:  Pt early to appointment, and RD unable to see. Current BW: 114#, down 3# since last RD visit ~6 weeks ago, continues to tolerate a Regular diet, receiving cycle 8 of FOLFOX + Avastin tomorrow. RD to follow up during next office visit. Pt has RD contact information and can contact prn.      [address] [facility], [address], [address] [facility] continue procardia XL 60 qd  acceptable

## 2022-07-18 NOTE — ED CDU PROVIDER INITIAL DAY NOTE - CROS ED SKIN ALL NEG
[FreeTextEntry1] : I, Antonia Garcia, assisted with documentation on 07/06/2022 acting as scribe for Dr. Fernando Mosquera. 
negative...

## 2023-09-08 ENCOUNTER — INPATIENT (INPATIENT)
Facility: HOSPITAL | Age: 73
LOS: 5 days | Discharge: HOME HEALTH | DRG: 155 | End: 2023-09-14
Attending: INTERNAL MEDICINE | Admitting: INTERNAL MEDICINE
Payer: MEDICARE

## 2023-09-08 VITALS
SYSTOLIC BLOOD PRESSURE: 176 MMHG | RESPIRATION RATE: 20 BRPM | WEIGHT: 199.96 LBS | HEIGHT: 55 IN | HEART RATE: 96 BPM | OXYGEN SATURATION: 95 % | DIASTOLIC BLOOD PRESSURE: 77 MMHG | TEMPERATURE: 100 F

## 2023-09-08 DIAGNOSIS — K11.20 SIALOADENITIS, UNSPECIFIED: ICD-10-CM

## 2023-09-08 DIAGNOSIS — Z96.642 PRESENCE OF LEFT ARTIFICIAL HIP JOINT: Chronic | ICD-10-CM

## 2023-09-08 DIAGNOSIS — Z89.512 ACQUIRED ABSENCE OF LEFT LEG BELOW KNEE: Chronic | ICD-10-CM

## 2023-09-08 PROBLEM — I10 ESSENTIAL (PRIMARY) HYPERTENSION: Chronic | Status: ACTIVE | Noted: 2021-09-01

## 2023-09-08 PROBLEM — E78.5 HYPERLIPIDEMIA, UNSPECIFIED: Chronic | Status: ACTIVE | Noted: 2021-09-01

## 2023-09-08 LAB
ALBUMIN SERPL ELPH-MCNC: 3.8 G/DL — SIGNIFICANT CHANGE UP (ref 3.3–5)
ALP SERPL-CCNC: 103 U/L — SIGNIFICANT CHANGE UP (ref 40–120)
ALT FLD-CCNC: 35 U/L — SIGNIFICANT CHANGE UP (ref 10–45)
ANION GAP SERPL CALC-SCNC: 14 MMOL/L — SIGNIFICANT CHANGE UP (ref 5–17)
APTT BLD: 28.6 SEC — SIGNIFICANT CHANGE UP (ref 24.5–35.6)
AST SERPL-CCNC: 26 U/L — SIGNIFICANT CHANGE UP (ref 10–40)
BASE EXCESS BLDV CALC-SCNC: 4.7 MMOL/L — HIGH (ref -2–3)
BASOPHILS # BLD AUTO: 0.04 K/UL — SIGNIFICANT CHANGE UP (ref 0–0.2)
BASOPHILS NFR BLD AUTO: 0.3 % — SIGNIFICANT CHANGE UP (ref 0–2)
BILIRUB SERPL-MCNC: 0.4 MG/DL — SIGNIFICANT CHANGE UP (ref 0.2–1.2)
BUN SERPL-MCNC: 11 MG/DL — SIGNIFICANT CHANGE UP (ref 7–23)
CA-I SERPL-SCNC: 1.29 MMOL/L — SIGNIFICANT CHANGE UP (ref 1.15–1.33)
CALCIUM SERPL-MCNC: 9.5 MG/DL — SIGNIFICANT CHANGE UP (ref 8.4–10.5)
CHLORIDE BLDV-SCNC: 107 MMOL/L — SIGNIFICANT CHANGE UP (ref 96–108)
CHLORIDE SERPL-SCNC: 104 MMOL/L — SIGNIFICANT CHANGE UP (ref 96–108)
CO2 BLDV-SCNC: 34 MMOL/L — HIGH (ref 22–26)
CO2 SERPL-SCNC: 25 MMOL/L — SIGNIFICANT CHANGE UP (ref 22–31)
CREAT SERPL-MCNC: 1.04 MG/DL — SIGNIFICANT CHANGE UP (ref 0.5–1.3)
EGFR: 57 ML/MIN/1.73M2 — LOW
EOSINOPHIL # BLD AUTO: 0.18 K/UL — SIGNIFICANT CHANGE UP (ref 0–0.5)
EOSINOPHIL NFR BLD AUTO: 1.3 % — SIGNIFICANT CHANGE UP (ref 0–6)
GAS PNL BLDV: 139 MMOL/L — SIGNIFICANT CHANGE UP (ref 136–145)
GAS PNL BLDV: SIGNIFICANT CHANGE UP
GLUCOSE BLDV-MCNC: 172 MG/DL — HIGH (ref 70–99)
GLUCOSE SERPL-MCNC: 152 MG/DL — HIGH (ref 70–99)
HCO3 BLDV-SCNC: 32 MMOL/L — HIGH (ref 22–29)
HCT VFR BLD CALC: 39.2 % — SIGNIFICANT CHANGE UP (ref 34.5–45)
HCT VFR BLDA CALC: 59 % — CRITICAL HIGH (ref 34.5–46.5)
HGB BLD CALC-MCNC: 19.8 G/DL — CRITICAL HIGH (ref 11.7–16.1)
HGB BLD-MCNC: 12 G/DL — SIGNIFICANT CHANGE UP (ref 11.5–15.5)
IMM GRANULOCYTES NFR BLD AUTO: 0.6 % — SIGNIFICANT CHANGE UP (ref 0–0.9)
INR BLD: 1.23 RATIO — HIGH (ref 0.85–1.18)
LACTATE BLDV-MCNC: 1.7 MMOL/L — SIGNIFICANT CHANGE UP (ref 0.5–2)
LYMPHOCYTES # BLD AUTO: 1.55 K/UL — SIGNIFICANT CHANGE UP (ref 1–3.3)
LYMPHOCYTES # BLD AUTO: 10.9 % — LOW (ref 13–44)
MCHC RBC-ENTMCNC: 26.5 PG — LOW (ref 27–34)
MCHC RBC-ENTMCNC: 30.6 GM/DL — LOW (ref 32–36)
MCV RBC AUTO: 86.7 FL — SIGNIFICANT CHANGE UP (ref 80–100)
MONOCYTES # BLD AUTO: 1.11 K/UL — HIGH (ref 0–0.9)
MONOCYTES NFR BLD AUTO: 7.8 % — SIGNIFICANT CHANGE UP (ref 2–14)
NEUTROPHILS # BLD AUTO: 11.23 K/UL — HIGH (ref 1.8–7.4)
NEUTROPHILS NFR BLD AUTO: 79.1 % — HIGH (ref 43–77)
NRBC # BLD: 0 /100 WBCS — SIGNIFICANT CHANGE UP (ref 0–0)
PCO2 BLDV: 56 MMHG — HIGH (ref 39–42)
PH BLDV: 7.37 — SIGNIFICANT CHANGE UP (ref 7.32–7.43)
PLATELET # BLD AUTO: 222 K/UL — SIGNIFICANT CHANGE UP (ref 150–400)
PO2 BLDV: 23 MMHG — LOW (ref 25–45)
POTASSIUM BLDV-SCNC: 4.1 MMOL/L — SIGNIFICANT CHANGE UP (ref 3.5–5.1)
POTASSIUM SERPL-MCNC: 3.9 MMOL/L — SIGNIFICANT CHANGE UP (ref 3.5–5.3)
POTASSIUM SERPL-SCNC: 3.9 MMOL/L — SIGNIFICANT CHANGE UP (ref 3.5–5.3)
PROT SERPL-MCNC: 8.7 G/DL — HIGH (ref 6–8.3)
PROTHROM AB SERPL-ACNC: 12.8 SEC — SIGNIFICANT CHANGE UP (ref 9.5–13)
RBC # BLD: 4.52 M/UL — SIGNIFICANT CHANGE UP (ref 3.8–5.2)
RBC # FLD: 16.1 % — HIGH (ref 10.3–14.5)
SAO2 % BLDV: 28 % — LOW (ref 67–88)
SODIUM SERPL-SCNC: 143 MMOL/L — SIGNIFICANT CHANGE UP (ref 135–145)
WBC # BLD: 14.2 K/UL — HIGH (ref 3.8–10.5)
WBC # FLD AUTO: 14.2 K/UL — HIGH (ref 3.8–10.5)

## 2023-09-08 PROCEDURE — 99285 EMERGENCY DEPT VISIT HI MDM: CPT | Mod: FS

## 2023-09-08 PROCEDURE — 93971 EXTREMITY STUDY: CPT | Mod: 26,RT

## 2023-09-08 PROCEDURE — 70487 CT MAXILLOFACIAL W/DYE: CPT | Mod: 26,MA

## 2023-09-08 RX ORDER — DEXAMETHASONE 0.5 MG/5ML
8 ELIXIR ORAL EVERY 8 HOURS
Refills: 0 | Status: DISCONTINUED | OUTPATIENT
Start: 2023-09-08 | End: 2023-09-09

## 2023-09-08 RX ORDER — FAMOTIDINE 10 MG/ML
1 INJECTION INTRAVENOUS
Qty: 0 | Refills: 0 | DISCHARGE

## 2023-09-08 RX ORDER — LORATADINE 10 MG/1
1 TABLET ORAL
Qty: 0 | Refills: 0 | DISCHARGE

## 2023-09-08 RX ORDER — AMPICILLIN SODIUM AND SULBACTAM SODIUM 250; 125 MG/ML; MG/ML
3 INJECTION, POWDER, FOR SUSPENSION INTRAMUSCULAR; INTRAVENOUS EVERY 6 HOURS
Refills: 0 | Status: DISCONTINUED | OUTPATIENT
Start: 2023-09-08 | End: 2023-09-11

## 2023-09-08 RX ORDER — SODIUM CHLORIDE 9 MG/ML
1800 INJECTION INTRAMUSCULAR; INTRAVENOUS; SUBCUTANEOUS ONCE
Refills: 0 | Status: DISCONTINUED | OUTPATIENT
Start: 2023-09-08 | End: 2023-09-08

## 2023-09-08 RX ORDER — ASCORBIC ACID 60 MG
0 TABLET,CHEWABLE ORAL
Qty: 0 | Refills: 0 | DISCHARGE

## 2023-09-08 RX ORDER — ACETAMINOPHEN 500 MG
1000 TABLET ORAL ONCE
Refills: 0 | Status: COMPLETED | OUTPATIENT
Start: 2023-09-08 | End: 2023-09-08

## 2023-09-08 RX ORDER — SODIUM CHLORIDE 9 MG/ML
1000 INJECTION INTRAMUSCULAR; INTRAVENOUS; SUBCUTANEOUS ONCE
Refills: 0 | Status: COMPLETED | OUTPATIENT
Start: 2023-09-08 | End: 2023-09-08

## 2023-09-08 RX ORDER — ROSUVASTATIN CALCIUM 5 MG/1
1 TABLET ORAL
Qty: 0 | Refills: 0 | DISCHARGE

## 2023-09-08 RX ORDER — NIFEDIPINE 30 MG
1 TABLET, EXTENDED RELEASE 24 HR ORAL
Qty: 0 | Refills: 0 | DISCHARGE

## 2023-09-08 RX ORDER — SENNA PLUS 8.6 MG/1
0 TABLET ORAL
Qty: 0 | Refills: 0 | DISCHARGE

## 2023-09-08 RX ORDER — ACETAMINOPHEN 500 MG
650 TABLET ORAL ONCE
Refills: 0 | Status: DISCONTINUED | OUTPATIENT
Start: 2023-09-08 | End: 2023-09-08

## 2023-09-08 RX ORDER — PANTOPRAZOLE SODIUM 20 MG/1
40 TABLET, DELAYED RELEASE ORAL DAILY
Refills: 0 | Status: DISCONTINUED | OUTPATIENT
Start: 2023-09-08 | End: 2023-09-14

## 2023-09-08 RX ORDER — KETOROLAC TROMETHAMINE 30 MG/ML
15 SYRINGE (ML) INJECTION ONCE
Refills: 0 | Status: DISCONTINUED | OUTPATIENT
Start: 2023-09-08 | End: 2023-09-08

## 2023-09-08 RX ADMIN — Medication 1000 MILLIGRAM(S): at 13:58

## 2023-09-08 RX ADMIN — Medication 100 MILLIGRAM(S): at 21:45

## 2023-09-08 RX ADMIN — Medication 400 MILLIGRAM(S): at 12:48

## 2023-09-08 RX ADMIN — AMPICILLIN SODIUM AND SULBACTAM SODIUM 200 GRAM(S): 250; 125 INJECTION, POWDER, FOR SUSPENSION INTRAMUSCULAR; INTRAVENOUS at 23:25

## 2023-09-08 RX ADMIN — Medication 15 MILLIGRAM(S): at 19:33

## 2023-09-08 RX ADMIN — Medication 100 MILLIGRAM(S): at 13:19

## 2023-09-08 RX ADMIN — SODIUM CHLORIDE 1000 MILLILITER(S): 9 INJECTION INTRAMUSCULAR; INTRAVENOUS; SUBCUTANEOUS at 12:48

## 2023-09-08 RX ADMIN — SODIUM CHLORIDE 1000 MILLILITER(S): 9 INJECTION INTRAMUSCULAR; INTRAVENOUS; SUBCUTANEOUS at 15:52

## 2023-09-08 RX ADMIN — Medication 15 MILLIGRAM(S): at 19:03

## 2023-09-08 NOTE — CONSULT NOTE ADULT - PROBLEM SELECTOR RECOMMENDATION 9
- CT maxillofacial scan revealed, no abscess.  - please initiate decadron 8mg Q8H x3 doses   - Unasyn with clindamycin   - f/up parotid culture(obtained and sent to lab)  - check mumps serum   - warm compresses over left parotid area. massage  - sialogogues (anything sour) Q4H to stimulate salary secretion   - keep hydrated   - pain control PRN  - if any concern or issues, please call [phone] - CT maxillofacial scan revealed, no abscess.  - please initiate decadron 8mg Q8H x3 doses   - Unasyn with clindamycin   - please send parotid culture  - check mumps serum   - warm compresses over left parotid area. massage  - sialogogues (anything sour) Q4H to stimulate salary secretion   - keep hydrated   - pain control PRN  - if any concern or issues, please call [phone] - CT maxillofacial scan revealed, no abscess.  - please initiate decadron 8mg Q8H x3 doses   - Unasyn with clindamycin   -  f/up parotid culture obtained and sent to lab   - check mumps serum   - warm compresses over left parotid area. massage  - sialogogues (anything sour) Q4H to stimulate salary secretion   - keep hydrated   - pain control PRN  - if any concern or issues, please call [phone] - CT maxillofacial scan revealed, no abscess.  - please initiate decadron 8mg Q8H x3 doses   - Unasyn with clindamycin   -  f/up parotid culture obtained and sent to lab   - check mumps serum   - warm compresses over left parotid area. massage  - sialogogues (anything sour) Q4H to stimulate salary secretion   - keep hydrated   - pain control PRN  - if any concern or issues, please call [phone]  - Please follow up outpatient with ENT Dr Stein, please call  [phone]. Add: [address], [place] [address]

## 2023-09-08 NOTE — ED PROVIDER NOTE - ATTENDING APP SHARED VISIT CONTRIBUTION OF CARE
Dr. Cheney: I performed a face to face bedside interview with patient regarding history of present illness, review of symptoms and past medical history. I completed an independent physical exam.  I have discussed patient's plan of care with NP. I agree with note as stated above, having amended the EMR as needed to reflect my findings.   This includes HISTORY OF PRESENT ILLNESS, HIV, PAST MEDICAL/SURGICAL/FAMILY/SOCIAL HISTORY, ALLERGIES AND HOME MEDICATIONS, REVIEW OF SYSTEMS, PHYSICAL EXAM, and any PROGRESS NOTES during the time I functioned as the attending physician for this patient.

## 2023-09-08 NOTE — ED ADULT NURSE NOTE - OBJECTIVE STATEMENT
72 yr old female to ED, accomp by spouse , with c/o l facial swelling x 3 days C/O fever or chills. H/O HTN, BKA LLE , Denies dental work Denies trauma.

## 2023-09-08 NOTE — ED PROVIDER NOTE - NS ED ATTENDING STATEMENT MOD
This was a shared visit with the MARIA DEL CARMEN. I reviewed and verified the documentation and independently performed the documented:

## 2023-09-08 NOTE — H&P ADULT - HISTORY OF PRESENT ILLNESS
73yo female pt with PMHx of HTN, HLD, s/p Right hip replacement (2014), PAD s/p BKA (2016) presents to ED with  left facial swelling, pain, and intermittent fever/ chills for 3days. Reports she's also had left upper gum pain since 3days. Denies swallowing pain or problems. Denies CP/SOB/ABD pain or N/V/D. Denies sensory changes or weakness to extremities. Denies cough, congestion, or recent cold symptoms.

## 2023-09-08 NOTE — ED PROVIDER NOTE - OBJECTIVE STATEMENT
PMHx of HTN, Right hip replacement (2014), s/p BKA (2016)  Dental pain, facial swelling, fever/ chills for 3days. 73yo female pt with PMHx of HTN, HLD, s/p Right hip replacement (2014), PAD s/p BKA (2016) presents to ED with  left facial swelling, pain, and intermittent fever/ chills for 3days. Reports she's also had left upper gum pain since 3days. Denies swallowing pain or problems. Denies CP/SOB/ABD pain or N/V/D. Denies sensory changes or weakness to extremities. Denies cough, congestion, or recent cold symptoms.

## 2023-09-08 NOTE — ED PROVIDER NOTE - PHYSICAL EXAMINATION
NAD. Hypertensive, Febrile. +Left cheek and submandibular swelling/tender. +Left upper gum swelling/tender. No tonsil swelling or exudates. Lungs clear. ABD soft, non tender. +Left BKA. +Right peripheral edema (chronic as per pt). Neuro- intact.

## 2023-09-08 NOTE — H&P ADULT - ASSESSMENT
Colace 100 mg daily      Last Written Prescription Date:  Colace 50 mg b.i.d.   Last Fill Quantity: 60,   # refills: 1  Last Office Visit: 6-  Future Office visit:  08-  Next 5 appointments (look out 90 days)    Aug 07, 2020  1:30 PM CDT  (Arrive by 1:15 PM)  Nurse Only with Hc Dm Nurse  [facility] ([facility] [address] [place] [address]  [phone]   Aug 07, 2020  2:10 PM CDT  (Arrive by 1:55 PM)  Office Visit with Amberly Whyte NP  [facility] ([facility] ) [address]  [place] [address]  [phone]           Routing refill request to provider for review/approval because:  Colace 50 mg b.i.d. not covered by insurance. Pharmacy requesting to change to colace 100 mg daily     
The patient is a 72y Female complaining of  facial swelling,    Parotiditis:    ENT eval appreciated  IV Unasyn  IV decadron x 3 doses  F/up with CS

## 2023-09-08 NOTE — CONSULT NOTE ADULT - SUBJECTIVE AND OBJECTIVE BOX
CC: Left facial swelling     HPI:  72-year-old female history of HTN, HLD, DMII, PAD status post left BKA in 2016, status post right hip replacement in 2014, presenting with 3-day history of atraumatic left facial swelling, pain, and intermittent subjective fevers and chills for 3 days. CT revealed Left ductal sialadenitis. Left parotiditis with a 2 mm obstructing calculus within the left parotid gland, Associated adjacent cellulitis of the left side of the face. ENT consulted for further evaluation.     PAST MEDICAL & SURGICAL HISTORY:  Mild HTN      HLD (hyperlipidemia)      S/P below knee amputation, left      H/O total hip arthroplasty, left        Allergies    oxybutynin (Other)    Intolerances      MEDICATIONS  (STANDING):  clindamycin IVPB      clindamycin IVPB 900 milliGRAM(s) IV Intermittent every 8 hours    MEDICATIONS  (PRN):      Social History: **??**    Family history: **??**    ROS:   ENT: all negative except as noted in HPI   CV: denies palpitations  Pulm: denies SOB, cough, hemoptysis  GI: denies change in appetite, indigestion, n/v  : denies pertinent urinary symptoms, urgency  Neuro: denies numbness/tingling, loss of sensation  Psych: denies anxiety  MS: denies muscle weakness, instability  Heme: denies easy bruising or bleeding  Endo: denies heat/cold intolerance, excessive sweating  Vascular: denies LE edema    Vital Signs Last 24 Hrs  T(C): 37.8 (08 Sep 2023 10:36), Max: 37.8 (08 Sep 2023 10:36)  T(F): 100.1 (08 Sep 2023 10:36), Max: 100.1 (08 Sep 2023 10:36)  HR: 92 (08 Sep 2023 13:02) (92 - 96)  BP: 172/78 (08 Sep 2023 13:02) (172/78 - 176/77)  BP(mean): --  RR: 18 (08 Sep 2023 13:02) (18 - 20)  SpO2: 97% (08 Sep 2023 13:02) (95% - 97%)    Parameters below as of 08 Sep 2023 13:02  Patient On (Oxygen Delivery Method): room air                              12.0   14.20 )-----------( 222      ( 08 Sep 2023 13:11 )             39.2    09-08    143  |  104  |  11  ----------------------------<  152<H>  3.9   |  25  |  1.04    Ca    9.5      08 Sep 2023 13:11    TPro  8.7<H>  /  Alb  3.8  /  TBili  0.4  /  DBili  x   /  AST  26  /  ALT  35  /  AlkPhos  103  09-08   PT/INR - ( 08 Sep 2023 13:11 )   PT: 12.8 sec;   INR: 1.23 ratio         PTT - ( 08 Sep 2023 13:11 )  PTT:28.6 sec    PHYSICAL EXAM:  Gen: NAD  Skin: No rashes, bruises, or lesions  Head: Normocephalic, Atraumatic  Face: no edema, erythema, or fluctuance. Parotid glands soft without mass  Eyes: no scleral injection  Ears: Right: ear canal clear, TM intact without effusion or erythema. No evidence of any fluid drainage. No mastoid tenderness, erythema, or ear bulging            Left: ear canal clear, TM intact without effusion or erythema. No evidence of any fluid drainage. No mastoid tenderness, erythema, or ear bulging  Nose: Nares bilaterally patent, no discharge  Mouth: No Stridor / Drooling / Trismus.  Mucosa moist, tongue/uvula midline, oropharynx clear  Neck: Flat, supple, no lymphadenopathy, trachea midline, no masses  Lymphatic: No lymphadenopathy  Resp: breathing easily, no stridor  CV: no peripheral edema/cyanosis  GI: nondistended   Peripheral vascular: chronic right lower extremity 2+edema   Neuro: facial nerve intact, no facial droop      Diagnostic Nasal Endoscopy: (Scope #2 used)    Fiberoptic Indirect laryngoscopy:  (Scope #2 used)    IMAGING/ADDITIONAL STUDIES:  CC: Left facial swelling     HPI:  72-year-old female history of HTN, HLD, DMII, PAD status post left BKA in 2016, status post right hip replacement in 2014, presenting with 3-day history of atraumatic left facial swelling, pain, and intermittent subjective fevers and chills for 3 days. CT revealed Left ductal sialadenitis. Left parotiditis with a 2 mm obstructing calculus within the left parotid gland, Associated adjacent cellulitis of the left side of the face. ENT consulted for further evaluation. Per patient, she doesn't drink a lot of fluid on daily basis. She has been drinking less due to left facial pain. Endorses fever, chills, and difficulty opening mouth wide. But denies dysphagia, odynophagia or difficulty handling secretions. Currently, pt denies N/V, recent URI, SOB, rhinorrhea,  dysphonia, changes in voice or inability to tolerate secretions.    PAST MEDICAL & SURGICAL HISTORY:  Mild HTN    HLD (hyperlipidemia)    S/P below knee amputation, left    H/O total hip arthroplasty, left    Allergies    oxybutynin (Other)    Intolerances    MEDICATIONS  (STANDING):  clindamycin IVPB      clindamycin IVPB 900 milliGRAM(s) IV Intermittent every 8 hours    MEDICATIONS  (PRN):    Social History: denies history of substance use    Family history: no pertinent family history     ROS:   ENT: all negative except as noted in HPI   CV: denies palpitations  Pulm: denies SOB, cough, hemoptysis  GI: endorses drinking and eating less due to facial pain, denies n/v  : denies pertinent urinary symptoms, urgency  Neuro: denies numbness/tingling, loss of sensation  Psych: denies anxiety  MS: endores instability  Heme: denies easy bruising or bleeding  Endo: denies heat/cold intolerance, excessive sweating  Vascular: denies LE edema    Vital Signs Last 24 Hrs  T(C): 37.8 (08 Sep 2023 10:36), Max: 37.8 (08 Sep 2023 10:36)  T(F): 100.1 (08 Sep 2023 10:36), Max: 100.1 (08 Sep 2023 10:36)  HR: 92 (08 Sep 2023 13:02) (92 - 96)  BP: 172/78 (08 Sep 2023 13:02) (172/78 - 176/77)  BP(mean): --  RR: 18 (08 Sep 2023 13:02) (18 - 20)  SpO2: 97% (08 Sep 2023 13:02) (95% - 97%)    Parameters below as of 08 Sep 2023 13:02  Patient On (Oxygen Delivery Method): room air               12.0   14.20 )-----------( 222      ( 08 Sep 2023 13:11 )             39.2    09-08    143  |  104  |  11  ----------------------------<  152<H>  3.9   |  25  |  1.04    Ca    9.5      08 Sep 2023 13:11    TPro  8.7<H>  /  Alb  3.8  /  TBili  0.4  /  DBili  x   /  AST  26  /  ALT  35  /  AlkPhos  103  09-08   PT/INR - ( 08 Sep 2023 13:11 )   PT: 12.8 sec;   INR: 1.23 ratio         PTT - ( 08 Sep 2023 13:11 )  PTT:28.6 sec    PHYSICAL EXAM:  Gen: NAD, sitting in wheel chair   Skin: No rashes, bruises, or lesions  Head: Normocephalic, Atraumatic  Face: no edema, erythema, or fluctuance. Parotid glands soft without mass  Eyes: no scleral injection  Ears: Right: ear canal clear, TM intact without effusion or erythema. No evidence of any fluid drainage. No mastoid tenderness, erythema, or ear bulging            Left: ear canal clear, TM intact without effusion or erythema. No evidence of any fluid drainage. No mastoid tenderness, erythema, or ear bulging  Nose: Nares bilaterally patent, no discharge  Mouth: very dry and dehydrated oral mucosa. mild trismus. no pus expressed from left parotid.  No Stridor / Drooling.   tongue/uvula midline, oropharynx clear.   Neck: Flat, supple, no lymphadenopathy, trachea midline, no masses  Lymphatic: No lymphadenopathy  Resp: breathing easily, no stridor  CV: no peripheral edema/cyanosis  GI: nondistended   Peripheral vascular: chronic right lower extremity 2+edema   Neuro: facial nerve intact, no facial droop    IMAGING/ADDITIONAL STUDIES:     CT MAXILLOFACIAL IC ORDERED BY: BERNICE CHAVEZ    PROCEDURE DATE: 09/08/2023    INTERPRETATION: INDICATIONS: Left facial swelling, pain and intermittent fevers and chills..    TECHNIQUE: Axial thin sections images were obtained from the top of the frontal sinuses through the bottom of the mandible following intravenous contrast administration utilizing multislice helical technique. Reformatted coronal and sagittal images were also obtained.90cc cc's Omnipaque 350 were administered. 10 cc's were discarded.    COMPARISON EXAMINATION: CT maxillofacial 9/1/2021..    FINDINGS:  Evaluation is limited by streak artifact.    SALIVARY GLANDS: Tubular rim-enhancing low-attenuation collection seen within the left side of the face within the expected course of the left parotid duct. The collection measures approximately 6.8 cm x 1.7 cm x 1.0cm (AP x TR x CC). There is a 2 mm calculus within the left parotid gland, likely the obstructing calculus. Adjacent inflammatory changes are noted within the subcutaneous tissues of the left face. There is also hyperenhancement and enlargement of the left parotid gland. Enlargement and edematous changes are also seen within the left masseter muscle. Reactive myositis of the left platysma muscle is also present.    Right parotid gland and right parotid duct appear similar to prior exam. 2.1 mm calculus in the right parotid gland remains stable.    Fatty atrophy of the bilateral submandibular glands.    Thyroid gland appears unremarkable.    LYMPH NODES: Multiple prominent and mildly enlarged right level 2 and 3 cervical chain lymph nodes likely reactive.  FACIAL BONES: Left TMJ osteoarthritis.  SINONASAL CAVITIES: Status post bilateral uncinectomies and maxillary antrostomies. Scattered mucosal retention cysts versus polyps in the maxillary sinuses are identified, and mucosal inflammation in the left ethmoid sinus is present. VISUALIZED INTRACRANIAL STRUCTURES: Normal.  ORBITAL CONTENTS: Normal.  REMAINING VISUALIZED BONES: Degenerative changes of the cervical spine.    IMPRESSION:  Tubular rim-enhancing collection within the expected course of the left parotid duct, most compatible with ductal sialadenitis. Left sided parotiditis. There is a 2 mm calculus within the left parotid gland, likely the obstructing calculus. Associated adjacent cellulitis of the left side of the face.    Right parotid gland and right parotid duct appear similar to prior exam. 2.1 mm calculus in the right parotid gland remains stable.    Rest of the findings are unchanged.    --- End of Report ---

## 2023-09-08 NOTE — ED PROVIDER NOTE - CLINICAL SUMMARY MEDICAL DECISION MAKING FREE TEXT BOX
Dr. Cheney: 72-year-old female history of hypertension, hyperlipidemia, diabetes, peripheral arterial disease status post left BKA in 2016, status post right hip replacement in 2014, presenting with atraumatic left facial swelling, pain, and intermittent subjective fevers and chills for 3 days.  Able to eat and drink.  No difficulty breathing, no chest pain or shortness of breath.  No recent dental work.  Patient is accompanied by family.    On exam patient is chronically ill-appearing, no acute distress, 5 cm x 6 cm indurated, erythematous, firm area of edema over the left parotid area and left submandibular area.  No trismus.  No tongue elevation.  Tenderness to palpation over the left upper gum.  No dental tenderness to percussion.  Heart regular rate and rhythm, lungs clear to auscultation bilaterally, abdomen soft nontender nondistended, left lower extremity status post BKA, right lower extremity with chronic venous stasis changes.  2+ edema right lower extremity which is baseline as per patient.    Concern for facial abscess, unclear if odontogenic in origin or not.  Reagan's unlikely. Given vitals sepsis work-up started, patient does not meet criteria for severe sepsis or septic shock at this time.  Will give antibiotics, CT maxillofacial with IV contrast, and also right lower extremity duplex rule out DVT.  Patient will need admission for IV antibiotics, unable to place in CDU as patient requires assistance with ambulation.

## 2023-09-09 RX ORDER — DEXAMETHASONE 0.5 MG/5ML
8 ELIXIR ORAL EVERY 8 HOURS
Refills: 0 | Status: COMPLETED | OUTPATIENT
Start: 2023-09-09 | End: 2023-09-09

## 2023-09-09 RX ORDER — INFLUENZA VIRUS VACCINE 15; 15; 15; 15 UG/.5ML; UG/.5ML; UG/.5ML; UG/.5ML
0.7 SUSPENSION INTRAMUSCULAR ONCE
Refills: 0 | Status: DISCONTINUED | OUTPATIENT
Start: 2023-09-09 | End: 2023-09-14

## 2023-09-09 RX ORDER — ACETAMINOPHEN 500 MG
1000 TABLET ORAL ONCE
Refills: 0 | Status: COMPLETED | OUTPATIENT
Start: 2023-09-09 | End: 2023-09-09

## 2023-09-09 RX ORDER — KETOROLAC TROMETHAMINE 30 MG/ML
15 SYRINGE (ML) INJECTION ONCE
Refills: 0 | Status: DISCONTINUED | OUTPATIENT
Start: 2023-09-09 | End: 2023-09-09

## 2023-09-09 RX ADMIN — PANTOPRAZOLE SODIUM 40 MILLIGRAM(S): 20 TABLET, DELAYED RELEASE ORAL at 12:22

## 2023-09-09 RX ADMIN — Medication 15 MILLIGRAM(S): at 07:01

## 2023-09-09 RX ADMIN — Medication 101.6 MILLIGRAM(S): at 22:44

## 2023-09-09 RX ADMIN — Medication 15 MILLIGRAM(S): at 07:40

## 2023-09-09 RX ADMIN — Medication 400 MILLIGRAM(S): at 00:55

## 2023-09-09 RX ADMIN — Medication 1000 MILLIGRAM(S): at 01:55

## 2023-09-09 RX ADMIN — AMPICILLIN SODIUM AND SULBACTAM SODIUM 200 GRAM(S): 250; 125 INJECTION, POWDER, FOR SUSPENSION INTRAMUSCULAR; INTRAVENOUS at 06:13

## 2023-09-09 RX ADMIN — Medication 101.6 MILLIGRAM(S): at 14:50

## 2023-09-09 RX ADMIN — Medication 101.6 MILLIGRAM(S): at 06:49

## 2023-09-09 RX ADMIN — AMPICILLIN SODIUM AND SULBACTAM SODIUM 200 GRAM(S): 250; 125 INJECTION, POWDER, FOR SUSPENSION INTRAMUSCULAR; INTRAVENOUS at 18:36

## 2023-09-09 RX ADMIN — AMPICILLIN SODIUM AND SULBACTAM SODIUM 200 GRAM(S): 250; 125 INJECTION, POWDER, FOR SUSPENSION INTRAMUSCULAR; INTRAVENOUS at 12:22

## 2023-09-09 NOTE — PROGRESS NOTE ADULT - ASSESSMENT
The patient is a 72y Female complaining of  facial swelling,    Parotiditis:    ENT eval appreciated  IV Unasyn  IV decadron x 3 doses  F/up with CS  ID eval appreciated

## 2023-09-09 NOTE — PATIENT PROFILE ADULT - FALL HARM RISK - RISK INTERVENTIONS

## 2023-09-09 NOTE — CONSULT NOTE ADULT - ASSESSMENT
73yo female pt with PMHx of HTN, HLD, s/p Right hip replacement (2014), PAD s/p BKA (2016) presented to ED with  left facial swelling, pain, and intermittent fever/ chills for 3days P|TA. Reports she's also had left upper gum pain since 3days. CT with Tubular rim-enhancing collection within the expected course of the left parotid duct, most compatible with ductal sialadenitis.    RECOMMENDATIONS  1-ductal sialadenitis  agree with nonsurgical approach with steroids, topical warm moist compresses, and - sialogogues to stimulate salary secretion   Unasyn IV  and will order additional testing to clarify etiology and guide managrment    Thank you for consulting us and involving us in the management of this most interesting and challenging case.  We will follow along in the care of this patient. Please call us at [phone] or text me directly on my cell# at [phone] with any concerns.  
 72-year-old female history of HTN, HLD, DMII, PAD status post left BKA in 2016, status post right hip replacement in 2014, presenting with 3-day history of atraumatic left facial swelling, pain, and intermittent subjective fevers and chills for 3 days. CT revealed Left ductal sialadenitis. Left parotiditis with a 2 mm obstructing calculus within the left parotid gland, Associated adjacent cellulitis of the left side of the face. ENT consulted for further evaluation. pt endorses inadequate fluid intake daily. On physical exam, left facial swelling, no drooling, no pooling of secretions.  noted very dry oral mucosa, with mild trismus. left edematous and swollen firm mucosa palpated along left parotid and left submandibular area. no pus expressed from left parotid gland. CT maxillofacial scan revealed by attending, no abscess.

## 2023-09-09 NOTE — CONSULT NOTE ADULT - SUBJECTIVE AND OBJECTIVE BOX
[facility] DIVISION of INFECTIOUS DISEASE  Matt Hummel MD PhD, Valerie Fierro MD, Jacque Gayle MD, Carly Jamison MD, Bin Salinas MD  and providing coverage with Swati Castillo MD  Providing Infectious Disease Consultations at [facility], [facility], [facility]'s    Office# [phone] to schedule follow up appointments  Answering Service for urgent calls or New Consults [phone]  Cell# to text for urgent issues Matt Hummel [phone]     HPI:  71yo female pt with PMHx of HTN, HLD, s/p Right hip replacement (2014), PAD s/p BKA (2016) presented to ED with  left facial swelling, pain, and intermittent fever/ chills for 3days P|TA. Reports she's also had left upper gum pain since 3days. CT with Tubular rim-enhancing collection within the expected course of the left parotid duct, most compatible with ductal sialadenitis      PAST MEDICAL & SURGICAL HISTORY:  Mild HTN      HLD (hyperlipidemia)      S/P below knee amputation, left      H/O total hip arthroplasty, left          Antimicrobials  ampicillin/sulbactam  IVPB 3 Gram(s) IV Intermittent every 6 hours      Immunological  influenza  Vaccine (HIGH DOSE) 0.7 milliLiter(s) IntraMuscular once      Other  dexAMETHasone  IVPB 8 milliGRAM(s) IV Intermittent every 8 hours  pantoprazole   Suspension 40 milliGRAM(s) Oral daily      Allergies    oxybutynin (Other)    Intolerances        SOCIAL HISTORY:  Social History:      FAMILY HISTORY:  FH: colon cancer (Mother)        ROS:    EYES:  Negative  blurry vision or double vision  GASTROINTESTINAL:  Negative for nausea, vomiting, diarrhea  -otherwise negative except for subjective    Vital Signs Last 24 Hrs  T(C): 36.5 (09 Sep 2023 15:30), Max: 38 (09 Sep 2023 00:45)  T(F): 97.7 (09 Sep 2023 15:30), Max: 100.4 (09 Sep 2023 00:45)  HR: 77 (09 Sep 2023 15:30) (77 - 100)  BP: 140/70 (09 Sep 2023 15:30) (140/70 - 175/73)  BP(mean): --  RR: 18 (09 Sep 2023 15:30) (18 - 20)  SpO2: 98% (09 Sep 2023 15:30) (96% - 98%)    Parameters below as of 09 Sep 2023 15:30  Patient On (Oxygen Delivery Method): room air        PE:  In no distress  HEENT:  NC, PERRL, sclerae anicteric, conjunctivae clear, EOMI.  left facila swelling  Neck:  Supple, no adenopathy  Lungs:  No accessory muscle use, breathing comfortably  Cor:  distant  Abd:  Symmetric, normoactive BS.  Soft, nontender, no masses, guarding or rebound.  Liver and spleen not enlarged  Extrem:  No cyanosis or edema  Skin:  No rashes.  Neuro: grossly intact  Musc: moving all limbs freely, no focal deficits        LABS:                        12.0   14.20 )-----------( 222      ( 08 Sep 2023 13:11 )             39.2       WBC Count: 14.20 K/uL (09-08-23 @ 13:11)      09-08    143  |  104  |  11  ----------------------------<  152<H>  3.9   |  25  |  1.04    Ca    9.5      08 Sep 2023 13:11    TPro  8.7<H>  /  Alb  3.8  /  TBili  0.4  /  DBili  x   /  AST  26  /  ALT  35  /  AlkPhos  103  09-08      Creatinine: 1.04 mg/dL (09-08-23 @ 13:11)      Urinalysis Basic - ( 08 Sep 2023 13:11 )    Color: x / Appearance: x / SG: x / pH: x  Gluc: 152 mg/dL / Ketone: x  / Bili: x / Urobili: x   Blood: x / Protein: x / Nitrite: x   Leuk Esterase: x / RBC: x / WBC x   Sq Epi: x / Non Sq Epi: x / Bacteria: x              MICROBIOLOGY:      RADIOLOGY & ADDITIONAL STUDIES:    --< from: CT Maxillofacial w/ IV Cont (09.08.23 @ 17:37) >    ACC: [id number] EXAM:  CT MAXILLOFACIAL  IC   ORDERED BY: BERNICE CHAVEZ     PROCEDURE DATE:  09/08/2023          INTERPRETATION:  INDICATIONS:  Left facial swelling, pain and   intermittent fevers and chills..    TECHNIQUE:  Axial thin sections images were obtained from the top of the   frontal sinuses through the bottom of the mandible following intravenous   contrast administration utilizing multislice helical technique.    Reformatted coronal and sagittal images were also obtained.90cc cc's   Omnipaque 350 were administered. 10 cc's were discarded.    COMPARISON EXAMINATION:  CT maxillofacial 9/1/2021..    FINDINGS:  Evaluation is limited by streak artifact.      SALIVARY GLANDS: Tubular rim-enhancing low-attenuation collection seen   within the left side of the face within the expected course of the left   parotid duct. The collection measures approximately 6.8 cm x 1.7 cm x   1.0cm (AP x TR x CC). There is a 2 mm calculus within the left parotid   gland, likely the obstructing calculus.Adjacent inflammatory changes   are noted within the subcutaneous tissues of the left face. There is also   hyperenhancement and enlargement of the left parotid gland. Enlargement   and edematous changes are also seen within the left masseter muscle.  Reactive myositis of the left platysma muscle is also present.    Right parotid gland and right parotid duct appear similar to prior exam.   2.1 mm calculus in the right parotid gland remains stable.    Fatty atrophy of the bilateral submandibular glands.    Thyroid gland appears unremarkable.    LYMPH NODES: Multiple prominent and mildly enlarged right level 2 and 3   cervical chain lymph nodes likely reactive.  FACIAL BONES:  Left TMJ osteoarthritis.  SINONASAL CAVITIES: Status post bilateraluncinectomies and maxillary   antrostomies. Scattered mucosal retention cysts versus polyps in the   maxillary sinuses are identified, and mucosal inflammation in the left   ethmoid sinus is present. VISUALIZED INTRACRANIAL STRUCTURES:  Normal.  ORBITAL CONTENTS:  Normal.  REMAINING VISUALIZED BONES: Degenerative changes of the cervical spine.    IMPRESSION:  Tubular rim-enhancing collection within the expected course of the left   parotid duct, most compatible with ductal sialadenitis.  Left sided   parotiditis.   There is a 2 mm calculus within the left parotid gland,   likely the obstructing calculus. Associated adjacent cellulitis of the   left side of the face.    Right parotid gland and right parotid duct appear similar to prior exam.   2.1 mm calculus in the right parotid gland remains stable.    Rest of the findings are unchanged.

## 2023-09-09 NOTE — PATIENT PROFILE ADULT - NSTOBACCONEVERSMOKERY/N_GEN_A
ID Focus Note    Patient is in OR this afternoon.  Going to keep patient on IV antibiotics. Formal consultation to follow tomorrow      Assessment  1. 49-year-old male with emesis and abdominal pain occurring on post op day #2 due to postop small bowel obstruction and concerns for small-bowel perforation     2. S/p OR earlier this week and 3/28 for ventral hernia and incarcerated incisional hernia repair   - had history of recurrent ventral hernia, with previous mesh repair.  Had then developed incarcerated incisional hernia at surgical site  -went OR on 03/28 for previous mesh removal and closure of defect  -there was a cautery injury to the small bowel noted during the procedure.  Site was resected and sewn over     3. Anklylosing spondylitis     Plan  - recommend empiric IV vancomycin, cefepime, and Flagyl - continue  -going to the OR this afternoon, will follow operative findings and cultures  -Formal consultation to follow tomorrow AM    Patient s/e and staffed with Dr. Tidwell who is in agreement with the above assessment and plan.    Evelia Augustine PA-C    
Notified by RN of patient complaining of left-sided mild chest discomfort.  This has been going on for the last several hours.  It is worse with deep breathing and palpation making him have a sensation of shortness of breath.  He denies lower extremity swelling or calf tenderness.    Blood pressure (!) 142/87, pulse 80, temperature 98.4 °F (36.9 °C), temperature source Oral, resp. rate 16, height 5' 11\" (1.803 m), weight 113.6 kg (250 lb 6.4 oz), SpO2 96 %.    Respiratory: Tenderness with palpation below the left nipple.  No swelling.  Breath sounds normal.  CVS:  Heart sounds normal.  Not tachycardic.    Extremities:  No swelling or calf tenderness.    EKG:  Normal sinus rhythm, normal rate.  No acute ischemic changes.    Assessment:  Chest pain, reproducible with deep breathing and palpation.  Likely musculoskeletal.  Patient is already on DVT prophylaxis with Lovenox and has no lower extremity swelling.  EKG is also without acute ischemic changes.      Continue telemetry monitoring.  Obtain serial troponin.  Continue pain control.  If no improvement, may consider CT pulmonary angiogram although PE is less likely.  Incentive spirometry.    
Patient continues to have some chest discomfort and shortness of breath and now is significantly tachycardic with minimal activity, sinus tachycardia up to 150 on telemetry.  His initial troponin is negative and he did have a negative stress test earlier this year.    In view of the shortness of breath, atypical chest pain and sinus tachycardia in the setting of recent surgery, will obtain CT pulmonary angiogram.  
No

## 2023-09-09 NOTE — PATIENT PROFILE ADULT - FALL HARM RISK - FACTORS
[FreeTextEntry2] : bilateral knee pain, right worse then left  right below the knee amputation/Impaired gait/Weakness

## 2023-09-10 LAB
HCT VFR BLD CALC: 33.9 % — LOW (ref 34.5–45)
HGB BLD-MCNC: 10.6 G/DL — LOW (ref 11.5–15.5)
MCHC RBC-ENTMCNC: 26.7 PG — LOW (ref 27–34)
MCHC RBC-ENTMCNC: 31.3 GM/DL — LOW (ref 32–36)
MCV RBC AUTO: 85.4 FL — SIGNIFICANT CHANGE UP (ref 80–100)
MRSA PCR RESULT.: SIGNIFICANT CHANGE UP
NRBC # BLD: 0 /100 WBCS — SIGNIFICANT CHANGE UP (ref 0–0)
PLATELET # BLD AUTO: 248 K/UL — SIGNIFICANT CHANGE UP (ref 150–400)
RBC # BLD: 3.97 M/UL — SIGNIFICANT CHANGE UP (ref 3.8–5.2)
RBC # FLD: 15.7 % — HIGH (ref 10.3–14.5)
S AUREUS DNA NOSE QL NAA+PROBE: DETECTED
WBC # BLD: 16.73 K/UL — HIGH (ref 3.8–10.5)
WBC # FLD AUTO: 16.73 K/UL — HIGH (ref 3.8–10.5)

## 2023-09-10 RX ADMIN — AMPICILLIN SODIUM AND SULBACTAM SODIUM 200 GRAM(S): 250; 125 INJECTION, POWDER, FOR SUSPENSION INTRAMUSCULAR; INTRAVENOUS at 01:14

## 2023-09-10 RX ADMIN — AMPICILLIN SODIUM AND SULBACTAM SODIUM 200 GRAM(S): 250; 125 INJECTION, POWDER, FOR SUSPENSION INTRAMUSCULAR; INTRAVENOUS at 05:24

## 2023-09-10 RX ADMIN — PANTOPRAZOLE SODIUM 40 MILLIGRAM(S): 20 TABLET, DELAYED RELEASE ORAL at 11:16

## 2023-09-10 RX ADMIN — AMPICILLIN SODIUM AND SULBACTAM SODIUM 200 GRAM(S): 250; 125 INJECTION, POWDER, FOR SUSPENSION INTRAMUSCULAR; INTRAVENOUS at 11:16

## 2023-09-10 RX ADMIN — AMPICILLIN SODIUM AND SULBACTAM SODIUM 200 GRAM(S): 250; 125 INJECTION, POWDER, FOR SUSPENSION INTRAMUSCULAR; INTRAVENOUS at 17:48

## 2023-09-10 NOTE — PROGRESS NOTE ADULT - ASSESSMENT
The patient is a 72y Female complaining of  facial swelling,    Parotiditis:    ENT f/up  IV Unasyn  F/up with CS  ID f/up appreciated

## 2023-09-11 LAB
-  AMIKACIN: SIGNIFICANT CHANGE UP
-  AMIKACIN: SIGNIFICANT CHANGE UP
-  AMOXICILLIN/CLAVULANIC ACID: SIGNIFICANT CHANGE UP
-  AMOXICILLIN/CLAVULANIC ACID: SIGNIFICANT CHANGE UP
-  AMPICILLIN/SULBACTAM: SIGNIFICANT CHANGE UP
-  AMPICILLIN/SULBACTAM: SIGNIFICANT CHANGE UP
-  AMPICILLIN: SIGNIFICANT CHANGE UP
-  AMPICILLIN: SIGNIFICANT CHANGE UP
-  AZTREONAM: SIGNIFICANT CHANGE UP
-  AZTREONAM: SIGNIFICANT CHANGE UP
-  CEFAZOLIN: SIGNIFICANT CHANGE UP
-  CEFAZOLIN: SIGNIFICANT CHANGE UP
-  CEFEPIME: SIGNIFICANT CHANGE UP
-  CEFEPIME: SIGNIFICANT CHANGE UP
-  CEFTRIAXONE: SIGNIFICANT CHANGE UP
-  CEFTRIAXONE: SIGNIFICANT CHANGE UP
-  CIPROFLOXACIN: SIGNIFICANT CHANGE UP
-  CIPROFLOXACIN: SIGNIFICANT CHANGE UP
-  ERTAPENEM: SIGNIFICANT CHANGE UP
-  ERTAPENEM: SIGNIFICANT CHANGE UP
-  GENTAMICIN: SIGNIFICANT CHANGE UP
-  GENTAMICIN: SIGNIFICANT CHANGE UP
-  IMIPENEM: SIGNIFICANT CHANGE UP
-  IMIPENEM: SIGNIFICANT CHANGE UP
-  LEVOFLOXACIN: SIGNIFICANT CHANGE UP
-  LEVOFLOXACIN: SIGNIFICANT CHANGE UP
-  MEROPENEM: SIGNIFICANT CHANGE UP
-  MEROPENEM: SIGNIFICANT CHANGE UP
-  PIPERACILLIN/TAZOBACTAM: SIGNIFICANT CHANGE UP
-  PIPERACILLIN/TAZOBACTAM: SIGNIFICANT CHANGE UP
-  TOBRAMYCIN: SIGNIFICANT CHANGE UP
-  TOBRAMYCIN: SIGNIFICANT CHANGE UP
-  TRIMETHOPRIM/SULFAMETHOXAZOLE: SIGNIFICANT CHANGE UP
-  TRIMETHOPRIM/SULFAMETHOXAZOLE: SIGNIFICANT CHANGE UP
ANION GAP SERPL CALC-SCNC: 17 MMOL/L — SIGNIFICANT CHANGE UP (ref 5–17)
BUN SERPL-MCNC: 19 MG/DL — SIGNIFICANT CHANGE UP (ref 7–23)
CALCIUM SERPL-MCNC: 8.5 MG/DL — SIGNIFICANT CHANGE UP (ref 8.4–10.5)
CHLORIDE SERPL-SCNC: 103 MMOL/L — SIGNIFICANT CHANGE UP (ref 96–108)
CO2 SERPL-SCNC: 16 MMOL/L — LOW (ref 22–31)
CREAT SERPL-MCNC: 0.82 MG/DL — SIGNIFICANT CHANGE UP (ref 0.5–1.3)
CULTURE RESULTS: SIGNIFICANT CHANGE UP
EGFR: 75 ML/MIN/1.73M2 — SIGNIFICANT CHANGE UP
GLUCOSE SERPL-MCNC: 76 MG/DL — SIGNIFICANT CHANGE UP (ref 70–99)
HCT VFR BLD CALC: 34.2 % — LOW (ref 34.5–45)
HGB BLD-MCNC: 10.7 G/DL — LOW (ref 11.5–15.5)
MCHC RBC-ENTMCNC: 26.6 PG — LOW (ref 27–34)
MCHC RBC-ENTMCNC: 31.3 GM/DL — LOW (ref 32–36)
MCV RBC AUTO: 85.1 FL — SIGNIFICANT CHANGE UP (ref 80–100)
METHOD TYPE: SIGNIFICANT CHANGE UP
METHOD TYPE: SIGNIFICANT CHANGE UP
NRBC # BLD: 0 /100 WBCS — SIGNIFICANT CHANGE UP (ref 0–0)
ORGANISM # SPEC MICROSCOPIC CNT: SIGNIFICANT CHANGE UP
PLATELET # BLD AUTO: 247 K/UL — SIGNIFICANT CHANGE UP (ref 150–400)
POTASSIUM SERPL-MCNC: 4 MMOL/L — SIGNIFICANT CHANGE UP (ref 3.5–5.3)
POTASSIUM SERPL-SCNC: 4 MMOL/L — SIGNIFICANT CHANGE UP (ref 3.5–5.3)
RBC # BLD: 4.02 M/UL — SIGNIFICANT CHANGE UP (ref 3.8–5.2)
RBC # FLD: 15.6 % — HIGH (ref 10.3–14.5)
SODIUM SERPL-SCNC: 136 MMOL/L — SIGNIFICANT CHANGE UP (ref 135–145)
SPECIMEN SOURCE: SIGNIFICANT CHANGE UP
WBC # BLD: 15.3 K/UL — HIGH (ref 3.8–10.5)
WBC # FLD AUTO: 15.3 K/UL — HIGH (ref 3.8–10.5)

## 2023-09-11 RX ORDER — ERTAPENEM SODIUM 1 G/1
1000 INJECTION, POWDER, LYOPHILIZED, FOR SOLUTION INTRAMUSCULAR; INTRAVENOUS EVERY 24 HOURS
Refills: 0 | Status: DISCONTINUED | OUTPATIENT
Start: 2023-09-11 | End: 2023-09-14

## 2023-09-11 RX ORDER — TRAMADOL HYDROCHLORIDE 50 MG/1
25 TABLET ORAL EVERY 8 HOURS
Refills: 0 | Status: DISCONTINUED | OUTPATIENT
Start: 2023-09-11 | End: 2023-09-14

## 2023-09-11 RX ORDER — HEPARIN SODIUM 5000 [USP'U]/ML
5000 INJECTION INTRAVENOUS; SUBCUTANEOUS EVERY 8 HOURS
Refills: 0 | Status: DISCONTINUED | OUTPATIENT
Start: 2023-09-11 | End: 2023-09-14

## 2023-09-11 RX ORDER — ACETAMINOPHEN 500 MG
650 TABLET ORAL EVERY 6 HOURS
Refills: 0 | Status: DISCONTINUED | OUTPATIENT
Start: 2023-09-11 | End: 2023-09-14

## 2023-09-11 RX ORDER — IBUPROFEN 200 MG
200 TABLET ORAL ONCE
Refills: 0 | Status: COMPLETED | OUTPATIENT
Start: 2023-09-11 | End: 2023-09-11

## 2023-09-11 RX ADMIN — ERTAPENEM SODIUM 120 MILLIGRAM(S): 1 INJECTION, POWDER, LYOPHILIZED, FOR SOLUTION INTRAMUSCULAR; INTRAVENOUS at 22:17

## 2023-09-11 RX ADMIN — HEPARIN SODIUM 5000 UNIT(S): 5000 INJECTION INTRAVENOUS; SUBCUTANEOUS at 23:11

## 2023-09-11 RX ADMIN — PANTOPRAZOLE SODIUM 40 MILLIGRAM(S): 20 TABLET, DELAYED RELEASE ORAL at 11:09

## 2023-09-11 RX ADMIN — Medication 200 MILLIGRAM(S): at 04:12

## 2023-09-11 RX ADMIN — TRAMADOL HYDROCHLORIDE 25 MILLIGRAM(S): 50 TABLET ORAL at 16:55

## 2023-09-11 RX ADMIN — TRAMADOL HYDROCHLORIDE 25 MILLIGRAM(S): 50 TABLET ORAL at 11:09

## 2023-09-11 RX ADMIN — AMPICILLIN SODIUM AND SULBACTAM SODIUM 200 GRAM(S): 250; 125 INJECTION, POWDER, FOR SUSPENSION INTRAMUSCULAR; INTRAVENOUS at 06:51

## 2023-09-11 RX ADMIN — Medication 200 MILLIGRAM(S): at 03:42

## 2023-09-11 RX ADMIN — AMPICILLIN SODIUM AND SULBACTAM SODIUM 200 GRAM(S): 250; 125 INJECTION, POWDER, FOR SUSPENSION INTRAMUSCULAR; INTRAVENOUS at 16:55

## 2023-09-11 RX ADMIN — AMPICILLIN SODIUM AND SULBACTAM SODIUM 200 GRAM(S): 250; 125 INJECTION, POWDER, FOR SUSPENSION INTRAMUSCULAR; INTRAVENOUS at 00:00

## 2023-09-11 RX ADMIN — AMPICILLIN SODIUM AND SULBACTAM SODIUM 200 GRAM(S): 250; 125 INJECTION, POWDER, FOR SUSPENSION INTRAMUSCULAR; INTRAVENOUS at 11:09

## 2023-09-11 NOTE — PROGRESS NOTE ADULT - ASSESSMENT
72-year-old female history of HTN, HLD, DMII, PAD status post left BKA in 2016, status post right hip replacement in 2014, presenting with 3-day history of atraumatic left facial swelling, pain, and intermittent subjective fevers and chills for 3 days. CT revealed Left ductal sialadenitis. Left parotiditis with a 2 mm obstructing calculus within the left parotid gland, Associated adjacent cellulitis of the left side of the face. ENT consulted for further evaluation. pt endorses inadequate fluid intake daily. On physical exam, left facial swelling, no drooling, no pooling of secretions.  noted very dry oral mucosa, with mild trismus. left edematous and swollen firm mucosa palpated along left parotid and left submandibular area. no pus expressed from left parotid gland. CT maxillofacial scan revealed by attending, no abscess.

## 2023-09-11 NOTE — PROGRESS NOTE ADULT - ASSESSMENT
The patient is a 72y Female complaining of  facial swelling,    Ductal sialadenitis with left parotiditis and calculus:      IV Ertapenem  ID/ENT f/up appreciated  Tramadol for pain

## 2023-09-11 NOTE — PROGRESS NOTE ADULT - ASSESSMENT
73yo female pt with PMHx of HTN, HLD, s/p Right hip replacement (2014), PAD s/p BKA (2016) presented to ED with  left facial swelling, pain, and intermittent fever/ chills for 3days P|TA. Reports she's also had left upper gum pain since 3days.  ductal sialadenitis with left parotiditis and calculus      RECOMMENDATIONS  1-ductal sialadenitis  agree with nonsurgical approach with steroids, topical warm moist compresses, and - sialogogues to stimulate salary secretion  mrsa neg    Unasyn IV  steroids-- reactive leukocytosis   sailogogues - lemon candy      73yo female pt with PMHx of HTN, HLD, s/p Right hip replacement (2014), PAD s/p BKA (2016) presented to ED with  left facial swelling, pain, and intermittent fever/ chills for 3days P|TA. Reports she's also had left upper gum pain since 3days.  ductal sialadenitis with left parotiditis and calculus      RECOMMENDATIONS  1-ductal sialadenitis   topical warm moist compresses, and -   sialogogues to stimulate salary secretion  mrsa neg    Unasyn IV day 4   steroids-- reactive leukocytosis      73yo female pt with PMHx of HTN, HLD, s/p Right hip replacement (2014), PAD s/p BKA (2016) presented to ED with  left facial swelling, pain, and intermittent fever/ chills for 3days P|TA. Reports she's also had left upper gum pain since 3days.  ductal sialadenitis with left parotiditis and calculus      RECOMMENDATIONS  1-ductal sialadenitis   topical warm moist compresses, and -   sialogogues to stimulate salary secretion  mrsa neg    culture esbl kleb and   candida- not a pathogen  stop unasyn  switch to ertapenem  steroids-- reactive leukocytosis

## 2023-09-12 LAB
ANION GAP SERPL CALC-SCNC: 12 MMOL/L — SIGNIFICANT CHANGE UP (ref 5–17)
BUN SERPL-MCNC: 16 MG/DL — SIGNIFICANT CHANGE UP (ref 7–23)
CALCIUM SERPL-MCNC: 7.8 MG/DL — LOW (ref 8.4–10.5)
CHLORIDE SERPL-SCNC: 102 MMOL/L — SIGNIFICANT CHANGE UP (ref 96–108)
CO2 SERPL-SCNC: 23 MMOL/L — SIGNIFICANT CHANGE UP (ref 22–31)
CREAT SERPL-MCNC: 0.96 MG/DL — SIGNIFICANT CHANGE UP (ref 0.5–1.3)
EGFR: 62 ML/MIN/1.73M2 — SIGNIFICANT CHANGE UP
GLUCOSE BLDC GLUCOMTR-MCNC: 134 MG/DL — HIGH (ref 70–99)
GLUCOSE SERPL-MCNC: 113 MG/DL — HIGH (ref 70–99)
HCT VFR BLD CALC: 36.2 % — SIGNIFICANT CHANGE UP (ref 34.5–45)
HGB BLD-MCNC: 11.1 G/DL — LOW (ref 11.5–15.5)
MCHC RBC-ENTMCNC: 26.7 PG — LOW (ref 27–34)
MCHC RBC-ENTMCNC: 30.7 GM/DL — LOW (ref 32–36)
MCV RBC AUTO: 87.2 FL — SIGNIFICANT CHANGE UP (ref 80–100)
NRBC # BLD: 0 /100 WBCS — SIGNIFICANT CHANGE UP (ref 0–0)
PLATELET # BLD AUTO: 271 K/UL — SIGNIFICANT CHANGE UP (ref 150–400)
POTASSIUM SERPL-MCNC: 3.8 MMOL/L — SIGNIFICANT CHANGE UP (ref 3.5–5.3)
POTASSIUM SERPL-SCNC: 3.8 MMOL/L — SIGNIFICANT CHANGE UP (ref 3.5–5.3)
RBC # BLD: 4.15 M/UL — SIGNIFICANT CHANGE UP (ref 3.8–5.2)
RBC # FLD: 16 % — HIGH (ref 10.3–14.5)
SODIUM SERPL-SCNC: 137 MMOL/L — SIGNIFICANT CHANGE UP (ref 135–145)
WBC # BLD: 10.65 K/UL — HIGH (ref 3.8–10.5)
WBC # FLD AUTO: 10.65 K/UL — HIGH (ref 3.8–10.5)

## 2023-09-12 PROCEDURE — 99232 SBSQ HOSP IP/OBS MODERATE 35: CPT

## 2023-09-12 RX ADMIN — TRAMADOL HYDROCHLORIDE 25 MILLIGRAM(S): 50 TABLET ORAL at 10:25

## 2023-09-12 RX ADMIN — HEPARIN SODIUM 5000 UNIT(S): 5000 INJECTION INTRAVENOUS; SUBCUTANEOUS at 05:11

## 2023-09-12 RX ADMIN — TRAMADOL HYDROCHLORIDE 25 MILLIGRAM(S): 50 TABLET ORAL at 00:12

## 2023-09-12 RX ADMIN — TRAMADOL HYDROCHLORIDE 25 MILLIGRAM(S): 50 TABLET ORAL at 09:26

## 2023-09-12 RX ADMIN — PANTOPRAZOLE SODIUM 40 MILLIGRAM(S): 20 TABLET, DELAYED RELEASE ORAL at 11:45

## 2023-09-12 RX ADMIN — TRAMADOL HYDROCHLORIDE 25 MILLIGRAM(S): 50 TABLET ORAL at 20:18

## 2023-09-12 RX ADMIN — HEPARIN SODIUM 5000 UNIT(S): 5000 INJECTION INTRAVENOUS; SUBCUTANEOUS at 13:25

## 2023-09-12 RX ADMIN — TRAMADOL HYDROCHLORIDE 25 MILLIGRAM(S): 50 TABLET ORAL at 01:10

## 2023-09-12 RX ADMIN — ERTAPENEM SODIUM 120 MILLIGRAM(S): 1 INJECTION, POWDER, LYOPHILIZED, FOR SOLUTION INTRAMUSCULAR; INTRAVENOUS at 21:23

## 2023-09-12 RX ADMIN — HEPARIN SODIUM 5000 UNIT(S): 5000 INJECTION INTRAVENOUS; SUBCUTANEOUS at 21:23

## 2023-09-12 RX ADMIN — TRAMADOL HYDROCHLORIDE 25 MILLIGRAM(S): 50 TABLET ORAL at 21:18

## 2023-09-12 NOTE — PHYSICAL THERAPY INITIAL EVALUATION ADULT - GAIT TRAINING, PT EVAL
GOAL: Patient will ambulate 50 feet with appropriate assistive device as needed independently, in 4 weeks.

## 2023-09-12 NOTE — PROGRESS NOTE ADULT - ASSESSMENT
72-year-old female history of HTN, HLD, DMII, PAD status post left BKA in 2016, status post right hip replacement in 2014, presenting with 3-day history of atraumatic left facial swelling, pain, and intermittent subjective fevers and chills for 3 days. CT revealed Left ductal sialadenitis. Left parotiditis with a 2 mm obstructing calculus within the left parotid gland, Associated adjacent cellulitis of the left side of the face. ENT consulted for further evaluation. pt endorses inadequate fluid intake daily. On physical exam, left facial swelling, no drooling, no pooling of secretions.  noted very dry oral mucosa, with mild trismus. left edematous and swollen firm mucosa palpated along left parotid and left submandibular area. no pus expressed from left parotid gland. CT maxillofacial scan shows no abscess.

## 2023-09-12 NOTE — PHYSICAL THERAPY INITIAL EVALUATION ADULT - PERTINENT HX OF CURRENT PROBLEM, REHAB EVAL
Pt is a 73yo female pt with PMHx of HTN, HLD, s/p Right hip replacement (2014), PAD s/p BKA (2016) presented to ED with  left facial swelling, pain, and intermittent fever/ chills for 3days PTA. Reports she's also had left upper gum pain since 3 days. Dx: ductal sialadenitis with left parotiditis and calculus. (-) [facility] Duplex RLE Vein Scan 9/8/23. Maxillofacial CT 9/8/23: Tubular rim-enhancing collection within the expected course of the left parotid duct, most compatible with ductal sialadenitis.  Left sided parotiditis. There is a 2 mm calculus within the left parotid gland, likely the obstructing calculus. Associated adjacent cellulitis of the left side of the face. Right parotid gland and right parotid duct appear similar to prior exam. 2.1 mm calculus in the right parotid gland remains stable.

## 2023-09-12 NOTE — PHYSICAL THERAPY INITIAL EVALUATION ADULT - ADDITIONAL COMMENTS
Pt reports she lives alone on the 1st floor of a house with ramp to enter. Pt reports she used forearm crutches for ambulation PTA. Pt uses wheelchair outdoors. Pt states she owns shower chair and rolling walker. Pt's LLE prosthesis and wheelchair present at bedside. +drives. +reading eyeglasses.

## 2023-09-12 NOTE — PROGRESS NOTE ADULT - ASSESSMENT
Patient is a 72 year old female with PMHx of HTN, HLD, s/p Right hip replacement (2014), PAD s/p BKA (2016) presented to ED with  left facial swelling, pain, and intermittent fever/ chills for 3days P|TA. Reports she's also had left upper gum pain since 3days.    Ductal sialadenitis with left parotiditis and calculus  mrsa negative   culture with ESBL Klebsiella; candida not a pathogen  steroids-- reactive leukocytosis   WBC improving, afebrile, feels some improvement     Recommendations:  ENT following   Continue ertapenem 1g IV Q24h    - can place midline/PICC given no good PO option, will need to complete total 10d course with IV antibiotic  Continue warm compresses, sialogogues to stimulate salary secretion  Pain management per primary team       Carly Jamison M.D.  [facility], Division of Infectious Diseases  [phone]  After 5pm on weekdays and all day on weekends - please call [phone]

## 2023-09-13 LAB
ANION GAP SERPL CALC-SCNC: 14 MMOL/L — SIGNIFICANT CHANGE UP (ref 5–17)
BUN SERPL-MCNC: 11 MG/DL — SIGNIFICANT CHANGE UP (ref 7–23)
CALCIUM SERPL-MCNC: 8.1 MG/DL — LOW (ref 8.4–10.5)
CHLORIDE SERPL-SCNC: 103 MMOL/L — SIGNIFICANT CHANGE UP (ref 96–108)
CO2 SERPL-SCNC: 22 MMOL/L — SIGNIFICANT CHANGE UP (ref 22–31)
CREAT SERPL-MCNC: 0.81 MG/DL — SIGNIFICANT CHANGE UP (ref 0.5–1.3)
CULTURE RESULTS: SIGNIFICANT CHANGE UP
CULTURE RESULTS: SIGNIFICANT CHANGE UP
EGFR: 77 ML/MIN/1.73M2 — SIGNIFICANT CHANGE UP
GLUCOSE SERPL-MCNC: 109 MG/DL — HIGH (ref 70–99)
HCT VFR BLD CALC: 34.1 % — LOW (ref 34.5–45)
HGB BLD-MCNC: 10.5 G/DL — LOW (ref 11.5–15.5)
MCHC RBC-ENTMCNC: 26.5 PG — LOW (ref 27–34)
MCHC RBC-ENTMCNC: 30.8 GM/DL — LOW (ref 32–36)
MCV RBC AUTO: 86.1 FL — SIGNIFICANT CHANGE UP (ref 80–100)
NRBC # BLD: 0 /100 WBCS — SIGNIFICANT CHANGE UP (ref 0–0)
PLATELET # BLD AUTO: 282 K/UL — SIGNIFICANT CHANGE UP (ref 150–400)
POTASSIUM SERPL-MCNC: 3.8 MMOL/L — SIGNIFICANT CHANGE UP (ref 3.5–5.3)
POTASSIUM SERPL-SCNC: 3.8 MMOL/L — SIGNIFICANT CHANGE UP (ref 3.5–5.3)
RBC # BLD: 3.96 M/UL — SIGNIFICANT CHANGE UP (ref 3.8–5.2)
RBC # FLD: 15.8 % — HIGH (ref 10.3–14.5)
SODIUM SERPL-SCNC: 139 MMOL/L — SIGNIFICANT CHANGE UP (ref 135–145)
SPECIMEN SOURCE: SIGNIFICANT CHANGE UP
SPECIMEN SOURCE: SIGNIFICANT CHANGE UP
WBC # BLD: 10.38 K/UL — SIGNIFICANT CHANGE UP (ref 3.8–10.5)
WBC # FLD AUTO: 10.38 K/UL — SIGNIFICANT CHANGE UP (ref 3.8–10.5)

## 2023-09-13 PROCEDURE — 99232 SBSQ HOSP IP/OBS MODERATE 35: CPT

## 2023-09-13 RX ORDER — CHLORHEXIDINE GLUCONATE 213 G/1000ML
1 SOLUTION TOPICAL DAILY
Refills: 0 | Status: DISCONTINUED | OUTPATIENT
Start: 2023-09-13 | End: 2023-09-14

## 2023-09-13 RX ADMIN — HEPARIN SODIUM 5000 UNIT(S): 5000 INJECTION INTRAVENOUS; SUBCUTANEOUS at 21:26

## 2023-09-13 RX ADMIN — HEPARIN SODIUM 5000 UNIT(S): 5000 INJECTION INTRAVENOUS; SUBCUTANEOUS at 05:10

## 2023-09-13 RX ADMIN — PANTOPRAZOLE SODIUM 40 MILLIGRAM(S): 20 TABLET, DELAYED RELEASE ORAL at 11:31

## 2023-09-13 RX ADMIN — ERTAPENEM SODIUM 120 MILLIGRAM(S): 1 INJECTION, POWDER, LYOPHILIZED, FOR SOLUTION INTRAMUSCULAR; INTRAVENOUS at 21:27

## 2023-09-13 NOTE — ADVANCED PRACTICE NURSE CONSULT - REASON FOR CONSULT
Midline Catheter Insertion Note    Catheter type: 4F  : Bard  Power injectable: Yes  Lot#  [id number]                                                                                                                                                                                                        Procedure assisted by: GARRICK Lane RN  Time out was preformed, confirming the patient's first and last name, date of birth, MR#, procedure, and correct site prior to start of procedure.    Patient was placed with HOB 30 degrees. Patient placement site was prepped with chlorhexidine solution, then draped using maximum sterile barrier protection. The area was injected with 2cc of 1% Lidocaine. Using the Bard Site Rite 8, the catheter was placed using the Modified Seldinger Technique. Strict adherence to outline aseptic technique including handwashing, glove and gown, utilizing mask and cap, plus draping the patient with a sterile drape was observed. Upon completion of line placement, the insertion site was covered with a sterile occlusive CHG dressing. Pt tolerated procedure well.     All materials used for catheter insertion, including the intact guide wires, were accounted for at the end of the procedure.  Number of attempts: 1  Complications/Comments: None    Emergency Placement: No  Site: New  Anatomical Site of insertion: Right Basilic  Catheter size/length: 4F, 20cm  US guided Bard single lumen power midline placed

## 2023-09-13 NOTE — PROGRESS NOTE ADULT - ASSESSMENT
The patient is a 72y Female complaining of  facial swelling,    Ductal sialadenitis with left parotiditis and calculus:      IV Ertapenem for 10 days  ID f/up appreciated  Tramadol for pain   S/p midline    Dc planning

## 2023-09-13 NOTE — PROGRESS NOTE ADULT - ASSESSMENT
Patient is a 72 year old female with PMHx of HTN, HLD, s/p Right hip replacement (2014), PAD s/p BKA (2016) presented to ED with  left facial swelling, pain, and intermittent fever/ chills for 3days P|TA. Reports she's also had left upper gum pain since 3days.    Ductal sialadenitis with left parotiditis and calculus  culture with ESBL Klebsiella; candida not a pathogen  steroids-- reactive leukocytosis   WBC normalized, afebrile, feels she is improving  ENT following     Recommendations:  Continue ertapenem 1g IV Q24h    - can place midline/PICC given no good PO option, will need to complete total 10d course with IV antibiotic  Continue warm compresses, sialogogues to stimulate salary secretion  Pain management per primary team       Carly Jamison M.D.  [facility], Division of Infectious Diseases  [phone]  After 5pm on weekdays and all day on weekends - please call [phone]

## 2023-09-14 ENCOUNTER — TRANSCRIPTION ENCOUNTER (OUTPATIENT)
Age: 73
End: 2023-09-14

## 2023-09-14 VITALS
RESPIRATION RATE: 17 BRPM | HEART RATE: 66 BPM | SYSTOLIC BLOOD PRESSURE: 163 MMHG | OXYGEN SATURATION: 96 % | TEMPERATURE: 98 F | DIASTOLIC BLOOD PRESSURE: 79 MMHG

## 2023-09-14 LAB
ALBUMIN SERPL ELPH-MCNC: 3.4 G/DL — SIGNIFICANT CHANGE UP (ref 3.3–5)
HCT VFR BLD CALC: 36.1 % — SIGNIFICANT CHANGE UP (ref 34.5–45)
HGB BLD-MCNC: 11.1 G/DL — LOW (ref 11.5–15.5)
MCHC RBC-ENTMCNC: 26.4 PG — LOW (ref 27–34)
MCHC RBC-ENTMCNC: 30.7 GM/DL — LOW (ref 32–36)
MCV RBC AUTO: 86 FL — SIGNIFICANT CHANGE UP (ref 80–100)
NRBC # BLD: 0 /100 WBCS — SIGNIFICANT CHANGE UP (ref 0–0)
PLATELET # BLD AUTO: 293 K/UL — SIGNIFICANT CHANGE UP (ref 150–400)
RBC # BLD: 4.2 M/UL — SIGNIFICANT CHANGE UP (ref 3.8–5.2)
RBC # FLD: 15.8 % — HIGH (ref 10.3–14.5)
WBC # BLD: 10.42 K/UL — SIGNIFICANT CHANGE UP (ref 3.8–10.5)
WBC # FLD AUTO: 10.42 K/UL — SIGNIFICANT CHANGE UP (ref 3.8–10.5)

## 2023-09-14 PROCEDURE — 85025 COMPLETE CBC W/AUTO DIFF WBC: CPT

## 2023-09-14 PROCEDURE — 83605 ASSAY OF LACTIC ACID: CPT

## 2023-09-14 PROCEDURE — 87040 BLOOD CULTURE FOR BACTERIA: CPT

## 2023-09-14 PROCEDURE — 85027 COMPLETE CBC AUTOMATED: CPT

## 2023-09-14 PROCEDURE — 85730 THROMBOPLASTIN TIME PARTIAL: CPT

## 2023-09-14 PROCEDURE — C1751: CPT

## 2023-09-14 PROCEDURE — 96375 TX/PRO/DX INJ NEW DRUG ADDON: CPT

## 2023-09-14 PROCEDURE — 97161 PT EVAL LOW COMPLEX 20 MIN: CPT

## 2023-09-14 PROCEDURE — 70487 CT MAXILLOFACIAL W/DYE: CPT | Mod: MA

## 2023-09-14 PROCEDURE — 82330 ASSAY OF CALCIUM: CPT

## 2023-09-14 PROCEDURE — 36415 COLL VENOUS BLD VENIPUNCTURE: CPT

## 2023-09-14 PROCEDURE — 99285 EMERGENCY DEPT VISIT HI MDM: CPT

## 2023-09-14 PROCEDURE — 87186 SC STD MICRODIL/AGAR DIL: CPT

## 2023-09-14 PROCEDURE — 80053 COMPREHEN METABOLIC PANEL: CPT

## 2023-09-14 PROCEDURE — 87640 STAPH A DNA AMP PROBE: CPT

## 2023-09-14 PROCEDURE — 97110 THERAPEUTIC EXERCISES: CPT

## 2023-09-14 PROCEDURE — 82803 BLOOD GASES ANY COMBINATION: CPT

## 2023-09-14 PROCEDURE — 80048 BASIC METABOLIC PNL TOTAL CA: CPT

## 2023-09-14 PROCEDURE — 87641 MR-STAPH DNA AMP PROBE: CPT

## 2023-09-14 PROCEDURE — 82435 ASSAY OF BLOOD CHLORIDE: CPT

## 2023-09-14 PROCEDURE — 87077 CULTURE AEROBIC IDENTIFY: CPT

## 2023-09-14 PROCEDURE — 82947 ASSAY GLUCOSE BLOOD QUANT: CPT

## 2023-09-14 PROCEDURE — 82962 GLUCOSE BLOOD TEST: CPT

## 2023-09-14 PROCEDURE — 93971 EXTREMITY STUDY: CPT

## 2023-09-14 PROCEDURE — 84295 ASSAY OF SERUM SODIUM: CPT

## 2023-09-14 PROCEDURE — 85014 HEMATOCRIT: CPT

## 2023-09-14 PROCEDURE — 36569 INSJ PICC 5 YR+ W/O IMAGING: CPT

## 2023-09-14 PROCEDURE — 87070 CULTURE OTHR SPECIMN AEROBIC: CPT

## 2023-09-14 PROCEDURE — 85018 HEMOGLOBIN: CPT

## 2023-09-14 PROCEDURE — 96374 THER/PROPH/DIAG INJ IV PUSH: CPT

## 2023-09-14 PROCEDURE — 82040 ASSAY OF SERUM ALBUMIN: CPT

## 2023-09-14 PROCEDURE — 84132 ASSAY OF SERUM POTASSIUM: CPT

## 2023-09-14 PROCEDURE — 85610 PROTHROMBIN TIME: CPT

## 2023-09-14 RX ORDER — TRAMADOL HYDROCHLORIDE 50 MG/1
0.5 TABLET ORAL
Qty: 9 | Refills: 0
Start: 2023-09-14 | End: 2023-09-19

## 2023-09-14 RX ORDER — ERTAPENEM SODIUM 1 G/1
1 INJECTION, POWDER, LYOPHILIZED, FOR SOLUTION INTRAMUSCULAR; INTRAVENOUS
Qty: 7 | Refills: 0
Start: 2023-09-14 | End: 2023-09-20

## 2023-09-14 RX ADMIN — HEPARIN SODIUM 5000 UNIT(S): 5000 INJECTION INTRAVENOUS; SUBCUTANEOUS at 05:05

## 2023-09-14 RX ADMIN — PANTOPRAZOLE SODIUM 40 MILLIGRAM(S): 20 TABLET, DELAYED RELEASE ORAL at 11:24

## 2023-09-14 RX ADMIN — HEPARIN SODIUM 5000 UNIT(S): 5000 INJECTION INTRAVENOUS; SUBCUTANEOUS at 13:04

## 2023-09-14 RX ADMIN — CHLORHEXIDINE GLUCONATE 1 APPLICATION(S): 213 SOLUTION TOPICAL at 11:26

## 2023-09-14 RX ADMIN — ERTAPENEM SODIUM 120 MILLIGRAM(S): 1 INJECTION, POWDER, LYOPHILIZED, FOR SOLUTION INTRAMUSCULAR; INTRAVENOUS at 16:17

## 2023-09-14 NOTE — PROGRESS NOTE ADULT - ASSESSMENT
Patient is a 72 year old female with PMHx of HTN, HLD, s/p Right hip replacement (2014), PAD s/p BKA (2016) presented to ED with  left facial swelling, pain, and intermittent fever/ chills for 3days P|TA. Reports she's also had left upper gum pain since 3days.    Ductal sialadenitis with left parotiditis and calculus  culture with ESBL Klebsiella; candida not a pathogen  steroids-- reactive leukocytosis -- resolved   ENT following   afebrile, continues to improved   s/p RUE midline 9/13 given no good PO option    Recommendations:  Continue ertapenem 1g IV Q24h -- complete total 10d course - end 9/20   - no labs needed   - Patient will follow up with Dr. Liliana De Paz next week in ID office   Continue warm compresses, sialogogues to stimulate salary secretion  Pain management per primary team       Carly Jamison M.D.  [facility], Division of Infectious Diseases  [phone]  After 5pm on weekdays and all day on weekends - please call [phone]

## 2023-09-14 NOTE — DISCHARGE NOTE PROVIDER - CARE PROVIDERS DIRECT ADDRESSES
[email or web address],DirectAddress_Unknown,DirectAddress_Unknown

## 2023-09-14 NOTE — PROGRESS NOTE ADULT - PROVIDER SPECIALTY LIST ADULT
ENT
Internal Medicine
ENT
Infectious Disease
Infectious Disease
Internal Medicine
Infectious Disease
Infectious Disease
Internal Medicine
ENT
ENT

## 2023-09-14 NOTE — DISCHARGE NOTE PROVIDER - CARE PROVIDER_API CALL
Esvin Stein.  Otolaryngology  [address] [place] [address], [address]  [place] [address]  Phone: [phone]  Fax: [phone]  Follow Up Time:     OMER APPLE  Phone: ()-  Fax: ()-  Follow Up Time:     dental clinic,   [address] [place] [address]  Phone: [phone]  Fax: (   )    -  Follow Up Time:

## 2023-09-14 NOTE — DISCHARGE NOTE NURSING/CASE MANAGEMENT/SOCIAL WORK - PATIENT PORTAL LINK FT
You can access the FollowMyHealth Patient Portal offered by [facility] by registering at the following website: [email or web address]. By joining [facility]’s FollowMyHealth portal, you will also be able to view your health information using other applications (apps) compatible with our system.

## 2023-09-14 NOTE — PROGRESS NOTE ADULT - PROBLEM SELECTOR PLAN 1
- ABX per ID, ertapenem   - warm compresses over left parotid area. massage  - sialogogues (anything sour) Q4H to stimulate salivary secretion   - hydration    - pain control PRN  - Please follow up outpatient with ENT Dr Stein, please call  [phone]. Add: [address], [place] [address].
- ABX per ID, ertapenem   - warm compresses over left parotid area. massage  - sialogogues (anything sour) Q4H to stimulate salivary secretion   - hydration    - pain control PRN  - Please follow up outpatient with ENT Dr Stein, please call  [phone]. Add: [address], [place] [address].
- Unasyn with clindamycin   - check mumps serum   - warm compresses over left parotid area. massage  - sialogogues (anything sour) Q4H to stimulate salary secretion   - keep hydrated   - pain control PRN  - Please follow up outpatient with ENT Dr Stein, please call  [phone]. Add: [address] , [place] [address].
- ABX per ID, ertapenem   - warm compresses over left parotid area. massage  - sialogogues (anything sour) Q4H to stimulate salivary secretion   - hydration    - pain control PRN  - Please follow up outpatient with ENT Dr Stein, please call  [phone]. Add: [address], [place] [address].

## 2023-09-14 NOTE — DISCHARGE NOTE PROVIDER - HOSPITAL COURSE
71yo female pt with PMHx of HTN, HLD, s/p Right hip replacement (2014), PAD s/p BKA (2016) presents to ED with  left facial swelling, pain, and intermittent fever/ chills for 3days. Reports she's also had left upper gum pain since 3days. Denies swallowing pain or problems. Denies CP/SOB/ABD pain or N/V/D. Denies sensory changes or weakness to extremities. Denies cough, congestion, or recent cold symptoms.   Problem: Parotiditis.   ·  Plan: - ABX per ID, ertapenem   - warm compresses over left parotid area. massage  - sialogogues (anything sour) Q4H to stimulate salivary secretion   - hydration    - pain control PRN  - Please follow up outpatient with ENT Dr Stein, please call  [phone]. Add: [address], [place] [address].  Continue ertapenem 1g IV Q24h    - can place midline/PICC given no good PO option, will need to complete total 10d course with IV antibiotic

## 2023-09-14 NOTE — PROGRESS NOTE ADULT - ASSESSMENT
72-year-old female history of HTN, HLD, DMII, PAD status post left BKA in 2016, status post right hip replacement in 2014, presenting with 3-day history of atraumatic left facial swelling, pain, and intermittent subjective fevers and chills for 3 days. CT revealed Left ductal sialadenitis. Left parotiditis with a 2 mm obstructing calculus within the left parotid gland, Associated adjacent cellulitis of the left side of the face, no abscess. Left facial swelling significantly decreased, no pus expressed from left kevin's duct. Pt is tolerating PO.

## 2023-09-14 NOTE — PROGRESS NOTE ADULT - REASON FOR ADMISSION
facial swelling
The patient is a 72y Female complaining of  facial swelling,

## 2023-09-14 NOTE — DISCHARGE NOTE NURSING/CASE MANAGEMENT/SOCIAL WORK - FLU SEASON?
Yes... Pt lives with  in a private home with 2 DAREK with no handrails. Pt has a 5 steps + landing + 7 steps inside the home with handrails on one side. Pt states her bedroom is on the second floor but can stay on the main floor prn. Pt has both a walk-in shower and tub./spouse

## 2023-09-14 NOTE — PROGRESS NOTE ADULT - SUBJECTIVE AND OBJECTIVE BOX
CC: Left facial swelling     HPI:  72-year-old female history of HTN, HLD, DMII, PAD status post left BKA in 2016, status post right hip replacement in 2014, presenting with 3-day history of atraumatic left facial swelling, pain, and intermittent subjective fevers and chills for 3 days. CT revealed Left ductal sialadenitis. Left parotiditis with a 2 mm obstructing calculus within the left parotid gland, Associated adjacent cellulitis of the left side of the face. ENT consulted for further evaluation. Per patient, she doesn't drink a lot of fluid on daily basis. She has been drinking less due to left facial pain. Endorses fever, chills, and difficulty opening mouth wide. But denies dysphagia, odynophagia or difficulty handling secretions. Currently, pt denies N/V, recent URI, SOB, rhinorrhea,  dysphonia, changes in voice or inability to tolerate secretions.    PAST MEDICAL & SURGICAL HISTORY:  Mild HTN      HLD (hyperlipidemia)      S/P below knee amputation, left      H/O total hip arthroplasty, left        Allergies    oxybutynin (Other)    Intolerances      MEDICATIONS  (STANDING):  ampicillin/sulbactam  IVPB 3 Gram(s) IV Intermittent every 6 hours  influenza  Vaccine (HIGH DOSE) 0.7 milliLiter(s) IntraMuscular once  pantoprazole   Suspension 40 milliGRAM(s) Oral daily    MEDICATIONS  (PRN):  acetaminophen     Tablet .. 650 milliGRAM(s) Oral every 6 hours PRN Temp greater or equal to 38C (100.4F), Mild Pain (1 - 3)  traMADol 25 milliGRAM(s) Oral every 8 hours PRN Moderate Pain (4 - 6)      social history: see consult     family history: see consult     ROS:   ENT: all negative except as noted in HPI   Pulm: denies SOB, cough, hemoptysis  Neuro: denies numbness/tingling, loss of sensation  Endo: denies heat/cold intolerance, excessive sweating      Vital Signs Last 24 Hrs  T(C): 36.7 (11 Sep 2023 04:27), Max: 36.7 (11 Sep 2023 04:27)  T(F): 98 (11 Sep 2023 04:27), Max: 98 (11 Sep 2023 04:27)  HR: 80 (11 Sep 2023 04:27) (71 - 80)  BP: 162/79 (11 Sep 2023 04:27) (152/64 - 162/79)  BP(mean): --  RR: 18 (11 Sep 2023 04:27) (18 - 18)  SpO2: 96% (11 Sep 2023 04:27) (96% - 98%)    Parameters below as of 11 Sep 2023 04:27  Patient On (Oxygen Delivery Method): room air                              10.6   16.73 )-----------( 248      ( 10 Sep 2023 07:00 )             33.9    09-11    136  |  103  |  19  ----------------------------<  76  4.0   |  16<L>  |  0.82    Ca    8.5      11 Sep 2023 07:11       PHYSICAL EXAM:  Gen: NAD, sitting in wheel chair   Skin: No rashes, bruises, or lesions  Head: Normocephalic, Atraumatic  Face: no edema, erythema, or fluctuance. Parotid glands soft without mass  Eyes: no scleral injection  Ears: Right: ear canal clear, TM intact without effusion or erythema. No evidence of any fluid drainage. No mastoid tenderness, erythema, or ear bulging            Left: ear canal clear, TM intact without effusion or erythema. No evidence of any fluid drainage. No mastoid tenderness, erythema, or ear bulging  Nose: Nares bilaterally patent, no discharge  Mouth: very dry and dehydrated oral mucosa. mild trismus. no pus expressed from left parotid.  No Stridor / Drooling.   tongue/uvula midline, oropharynx clear.   Neck: Flat, supple, no lymphadenopathy, trachea midline, no masses  Lymphatic: No lymphadenopathy  Resp: breathing easily, no stridor  CV: no peripheral edema/cyanosis  GI: nondistended   Peripheral vascular: chronic right lower extremity 2+edema   Neuro: facial nerve intact, no facial droop    IMAGING/ADDITIONAL STUDIES:     CT MAXILLOFACIAL IC ORDERED BY: BERNICE CHAVEZ    PROCEDURE DATE: 09/08/2023    INTERPRETATION: INDICATIONS: Left facial swelling, pain and intermittent fevers and chills..    TECHNIQUE: Axial thin sections images were obtained from the top of the frontal sinuses through the bottom of the mandible following intravenous contrast administration utilizing multislice helical technique. Reformatted coronal and sagittal images were also obtained.90cc cc's Omnipaque 350 were administered. 10 cc's were discarded.    COMPARISON EXAMINATION: CT maxillofacial 9/1/2021..    FINDINGS:  Evaluation is limited by streak artifact.    SALIVARY GLANDS: Tubular rim-enhancing low-attenuation collection seen within the left side of the face within the expected course of the left parotid duct. The collection measures approximately 6.8 cm x 1.7 cm x 1.0cm (AP x TR x CC). There is a 2 mm calculus within the left parotid gland, likely the obstructing calculus. Adjacent inflammatory changes are noted within the subcutaneous tissues of the left face. There is also hyperenhancement and enlargement of the left parotid gland. Enlargement and edematous changes are also seen within the left masseter muscle. Reactive myositis of the left platysma muscle is also present.    Right parotid gland and right parotid duct appear similar to prior exam. 2.1 mm calculus in the right parotid gland remains stable.    Fatty atrophy of the bilateral submandibular glands.    Thyroid gland appears unremarkable.    LYMPH NODES: Multiple prominent and mildly enlarged right level 2 and 3 cervical chain lymph nodes likely reactive.  FACIAL BONES: Left TMJ osteoarthritis.  SINONASAL CAVITIES: Status post bilateral uncinectomies and maxillary antrostomies. Scattered mucosal retention cysts versus polyps in the maxillary sinuses are identified, and mucosal inflammation in the left ethmoid sinus is present. VISUALIZED INTRACRANIAL STRUCTURES: Normal.  ORBITAL CONTENTS: Normal.  REMAINING VISUALIZED BONES: Degenerative changes of the cervical spine.    IMPRESSION:  Tubular rim-enhancing collection within the expected course of the left parotid duct, most compatible with ductal sialadenitis. Left sided parotiditis. There is a 2 mm calculus within the left parotid gland, likely the obstructing calculus. Associated adjacent cellulitis of the left side of the face.    Right parotid gland and right parotid duct appear similar to prior exam. 2.1 mm calculus in the right parotid gland remains stable.    Rest of the findings are unchanged.    --- End of Report ---      Culture - Other (09.08.23 @ 21:14)    Specimen Source: .Other Other   Culture Results:   Few Klebsiella pneumoniae  Moderate Rehana albicans "Susceptibilities not performed"    
ENT ISSUE/POD: Left facial swelling     HPI: 72-year-old female history of HTN, HLD, DMII, PAD status post left BKA in 2016, status post right hip replacement in 2014, presenting with 3-day history of atraumatic left facial swelling, pain, and intermittent subjective fevers and chills for 3 days. CT revealed Left ductal sialadenitis. Left parotiditis with a 2 mm obstructing calculus within the left parotid gland, Associated adjacent cellulitis of the left side of the face. ENT consulted for further evaluation. Per patient, she doesn't drink a lot of fluid on daily basis. She has been drinking less due to left facial pain. Endorses fever, chills, and difficulty opening mouth wide. But denies dysphagia, odynophagia or difficulty handling secretions. Currently, pt denies N/V, recent URI, SOB, rhinorrhea,  dysphonia, changes in voice or inability to tolerate secretions.        PAST MEDICAL & SURGICAL HISTORY:  Mild HTN      HLD (hyperlipidemia)      S/P below knee amputation, left      H/O total hip arthroplasty, left        Allergies    oxybutynin (Other)    Intolerances      MEDICATIONS  (STANDING):  ertapenem  IVPB 1000 milliGRAM(s) IV Intermittent every 24 hours  heparin   Injectable 5000 Unit(s) SubCutaneous every 8 hours  influenza  Vaccine (HIGH DOSE) 0.7 milliLiter(s) IntraMuscular once  pantoprazole   Suspension 40 milliGRAM(s) Oral daily    MEDICATIONS  (PRN):  acetaminophen     Tablet .. 650 milliGRAM(s) Oral every 6 hours PRN Temp greater or equal to 38C (100.4F), Mild Pain (1 - 3)  traMADol 25 milliGRAM(s) Oral every 8 hours PRN Moderate Pain (4 - 6)      Social History: see consult    Family history: see consult    ROS:   ENT: all negative except as noted in HPI   Pulm: denies SOB, cough, hemoptysis  Neuro: denies numbness/tingling, loss of sensation  Endo: denies heat/cold intolerance, excessive sweating      Vital Signs Last 24 Hrs  T(C): 36.8 (12 Sep 2023 05:41), Max: 36.9 (11 Sep 2023 22:23)  T(F): 98.2 (12 Sep 2023 05:41), Max: 98.4 (11 Sep 2023 22:23)  HR: 74 (12 Sep 2023 05:41) (70 - 77)  BP: 157/70 (12 Sep 2023 05:41) (132/69 - 157/70)  BP(mean): --  RR: 18 (12 Sep 2023 05:41) (18 - 18)  SpO2: 96% (12 Sep 2023 05:41) (92% - 96%)    Parameters below as of 12 Sep 2023 05:41  Patient On (Oxygen Delivery Method): room air                              11.1   10.65 )-----------( 271      ( 12 Sep 2023 07:15 )             36.2    09-12    137  |  102  |  16  ----------------------------<  113<H>  3.8   |  23  |  0.96    Ca    7.8<L>      12 Sep 2023 07:17         PHYSICAL EXAM:  Gen: NAD, sitting in wheel chair   Skin: No rashes, bruises, or lesions  Head: Normocephalic, Atraumatic  Face: no edema, erythema, or fluctuance. Parotid glands soft without mass  Eyes: no scleral injection  Nose: Nares bilaterally patent, no discharge  Mouth: very dry and dehydrated oral mucosa. mild trismus. no pus expressed from left parotid.  No Stridor / Drooling.   tongue/uvula midline, oropharynx clear.   Neck: Flat, supple, no lymphadenopathy, trachea midline, no masses  Lymphatic: No lymphadenopathy  Resp: breathing easily, no stridor  CV: no peripheral edema/cyanosis  GI: nondistended   Peripheral vascular: chronic right lower extremity 2+edema   Neuro: facial nerve intact, no facial droop        
[facility]  AMADO Rodrigues Y. Patel, S. Shah, G. Casimir  [phone]  ([phone] - weekdays after 5pm and weekends)    Name: CASE COOK  Age/Gender: 72y Female  MRN: [id number]    Interval History:  Patient seen and examined this morning.   Feels better overall, less pain and swelling.  Notes reviewed  No concerning overnight events  Afebrile   Allergies: oxybutynin (Other)    Objective:  Vitals:   T(F): 98.3 (09-14-23 @ 14:10), Max: 98.4 (09-13-23 @ 21:28)  HR: 66 (09-14-23 @ 14:10) (66 - 74)  BP: 163/79 (09-14-23 @ 14:10) (148/69 - 163/79)  RR: 17 (09-14-23 @ 14:10) (17 - 17)  SpO2: 96% (09-14-23 @ 14:10) (96% - 96%)  Physical Examination:  General: no acute distress  HEENT: NC/AT, dec L facial/neck TTP and swelling, neck supple  Respiratory: no acc muscle use, breathing comfortably  Cardiovascular: S1 and S2 present  Gastrointestinal: normal appearing, nondistended  Extremities: no edema, no cyanosis  Skin: no visible rash    Laboratory Studies:  CBC:                       11.1   10.42 )-----------( 293      ( 14 Sep 2023 06:51 )             36.1     WBC Trend:  10.42 09-14-23 @ 06:51  10.38 09-13-23 @ 07:30  10.65 09-12-23 @ 07:15  15.30 09-11-23 @ 11:38  16.73 09-10-23 @ 07:00  14.20 09-08-23 @ 13:11    CMP: 09-13    139  |  103  |  11  ----------------------------<  109<H>  3.8   |  22  |  0.81    Ca    8.1<L>      13 Sep 2023 07:28    TPro  x   /  Alb  3.4  /  TBili  x   /  DBili  x   /  AST  x   /  ALT  x   /  AlkPhos  x   09-14    Creatinine: 0.81 mg/dL (09-13-23 @ 07:28)  Creatinine: 0.96 mg/dL (09-12-23 @ 07:17)  Creatinine: 0.82 mg/dL (09-11-23 @ 07:11)  Creatinine: 1.04 mg/dL (09-08-23 @ 13:11)      LIVER FUNCTIONS - ( 14 Sep 2023 06:53 )  Alb: 3.4 g/dL / Pro: x     / ALK PHOS: x     / ALT: x     / AST: x     / GGT: x           Microbiology: reviewed   Culture - Other (collected 09-08-23 @ 21:14)  Source: .Other Other  Final Report (09-11-23 @ 17:32):    Numerous Klebsiella pneumoniae ESBL Multiple Morphological Strains    Moderate Rehana albicans "Susceptibilities not performed"  Organism: Klebsiella pneumoniae ESBL  Klebsiella pneumoniae ESBL (09-11-23 @ 17:32)  Organism: Klebsiella pneumoniae ESBL (09-11-23 @ 17:32)      Method Type: JOANN      -  Amikacin: S <=16      -  Amoxicillin/Clavulanic Acid: S <=8/4      -  Ampicillin: R >16 These ampicillin results predict results for amoxicillin      -  Ampicillin/Sulbactam: R >16/8      -  Aztreonam: R >16      -  Cefazolin: R >16      -  Cefepime: R >16      -  Ceftriaxone: R >32      -  Ciprofloxacin: R 1      -  Ertapenem: S <=0.5      -  Gentamicin: S <=2      -  Imipenem: S <=1      -  Levofloxacin: I 1      -  Meropenem: S <=1      -  Piperacillin/Tazobactam: R <=8      -  Tobramycin: S <=2      -  Trimethoprim/Sulfamethoxazole: R >2/38  Organism: Klebsiella pneumoniae ESBL (09-11-23 @ 17:32)      Method Type: JOANN      -  Amikacin: S <=16      -  Amoxicillin/Clavulanic Acid: I 16/8      -  Ampicillin: R >16 These ampicillin results predict results for amoxicillin      -  Ampicillin/Sulbactam: R >16/8      -  Aztreonam: R >16      -  Cefazolin: R >16      -  Cefepime: R >16      -  Ceftriaxone: R >32      -  Ciprofloxacin: I 0.5      -  Ertapenem: S <=0.5      -  Gentamicin: R >8      -  Imipenem: S <=1      -  Levofloxacin: S <=0.5      -  Meropenem: S <=1      -  Piperacillin/Tazobactam: R <=8      -  Tobramycin: I 8      -  Trimethoprim/Sulfamethoxazole: R >2/38    Culture - Blood (collected 09-08-23 @ 13:00)  Source: .Blood Blood-Peripheral  Final Report (09-13-23 @ 18:01):    No growth at 5 days    Culture - Blood (collected 09-08-23 @ 12:50)  Source: .Blood Blood-Peripheral  Final Report (09-13-23 @ 18:01):    No growth at 5 days    Radiology: reviewed     Medications:  acetaminophen     Tablet .. 650 milliGRAM(s) Oral every 6 hours PRN  chlorhexidine 2% Cloths 1 Application(s) Topical daily  ertapenem  IVPB 1000 milliGRAM(s) IV Intermittent every 24 hours  heparin   Injectable 5000 Unit(s) SubCutaneous every 8 hours  influenza  Vaccine (HIGH DOSE) 0.7 milliLiter(s) IntraMuscular once  pantoprazole   Suspension 40 milliGRAM(s) Oral daily  traMADol 25 milliGRAM(s) Oral every 8 hours PRN    Current Antimicrobials:  ertapenem  IVPB 1000 milliGRAM(s) IV Intermittent every 24 hours    Prior/Completed Antimicrobials:  clindamycin IVPB  clindamycin IVPB  clindamycin IVPB  
[facility], Division of Infectious Diseases  AMADO Rodrigues Y. Patel, S. Shah, G. Brad  [phone]  after hours and weekends [phone]    Name: CASE COOK  Age: 72y  Gender: Female  MRN: [id number]    Interval History--  Notes reviewed  cold in room making her shiver  states swelling left face down today compared to yesterday        Allergies    oxybutynin (Other)    Intolerances        Medications--  Antibiotics:  ampicillin/sulbactam  IVPB 3 Gram(s) IV Intermittent every 6 hours    Immunologic:  influenza  Vaccine (HIGH DOSE) 0.7 milliLiter(s) IntraMuscular once    Other:  acetaminophen     Tablet .. PRN  pantoprazole   Suspension  traMADol PRN      Review of Systems--  A 10-point review of systems was obtained.     Pertinent positives and negatives--  Constitutional: No fevers. No Chills. No Rigors.   Cardiovascular: No chest pain. No palpitations.  Respiratory: No shortness of breath. No cough.  Gastrointestinal: No nausea or vomiting. No diarrhea or constipation.   Psychiatric: Pleasant. Appropriate affect.    Review of systems otherwise negative except as previously noted.    Physical Examination--  Vital Signs: T(F): 97.7 (09-11-23 @ 12:10), Max: 98 (09-11-23 @ 04:27)  HR: 70 (09-11-23 @ 12:10)  BP: 132/69 (09-11-23 @ 12:10)  RR: 18 (09-11-23 @ 12:10)  SpO2: 96% (09-11-23 @ 12:10)  Wt(kg): --  General: Nontoxic-appearing Female in no acute distress.  HEENT: AT/NC. left parotid tender indurated  Neck: cervical lymphadenopathy   Nodes: None palpable.  Lungs: Clear bilaterally without rales, wheezing or rhonchi  Heart: Regular rate and rhythm.   Abdomen: Bowel sounds present and normoactive. Soft. Nondistended.  Extremities: No cyanosis or clubbing. No edema.   Skin: Warm. Dry. Good turgor. No rash. No vasculitic stigmata.  Psychiatric: Appropriate affect and mood for situation.         Laboratory Studies--  CBC                        10.7   15.30 )-----------( 247      ( 11 Sep 2023 11:38 )             34.2       Chemistries  09-11    136  |  103  |  19  ----------------------------<  76  4.0   |  16<L>  |  0.82    Ca    8.5      11 Sep 2023 07:11        Culture Data    Culture - Other (collected 08 Sep 2023 21:14)  Source: .Other Other  Preliminary Report (10 Sep 2023 18:35):    Few Klebsiella pneumoniae    Moderate Rehana albicans "Susceptibilities not performed"    Culture - Blood (collected 08 Sep 2023 13:00)  Source: .Blood Blood-Peripheral  Preliminary Report (10 Sep 2023 18:01):    No growth at 48 Hours    Culture - Blood (collected 08 Sep 2023 12:50)  Source: .Blood Blood-Peripheral  Preliminary Report (10 Sep 2023 18:01):    No growth at 48 Hours            
Patient is a 72y old  Female who presents with a chief complaint of The patient is a 72y Female complaining of  facial swelling, (09 Sep 2023 17:20)      SUBJECTIVE / OVERNIGHT EVENTS:    Events noted.  CONSTITUTIONAL: No fever,  or fatigue  RESPIRATORY: No cough, wheezing,  No shortness of breath  CARDIOVASCULAR: No chest pain, palpitations, dizziness, or leg swelling  GASTROINTESTINAL: No abdominal or epigastric pain.       MEDICATIONS  (STANDING):  ampicillin/sulbactam  IVPB 3 Gram(s) IV Intermittent every 6 hours  influenza  Vaccine (HIGH DOSE) 0.7 milliLiter(s) IntraMuscular once  pantoprazole   Suspension 40 milliGRAM(s) Oral daily    MEDICATIONS  (PRN):        CAPILLARY BLOOD GLUCOSE        I&O's Summary    09 Sep 2023 07:01  -  10 Sep 2023 07:00  --------------------------------------------------------  IN: 400 mL / OUT: 0 mL / NET: 400 mL    10 Sep 2023 07:01  -  10 Sep 2023 23:51  --------------------------------------------------------  IN: 480 mL / OUT: 400 mL / NET: 80 mL        T(C): 36.5 (09-10-23 @ 21:13), Max: 36.5 (09-10-23 @ 21:13)  HR: 72 (09-10-23 @ 21:13) (71 - 76)  BP: 161/73 (09-10-23 @ 21:13) (152/64 - 161/73)  RR: 18 (09-10-23 @ 21:13) (18 - 18)  SpO2: 98% (09-10-23 @ 21:13) (96% - 98%)    PHYSICAL EXAM:  GENERAL: NAD  NECK: Supple, No JVD  CHEST/LUNG: Clear to auscultation bilaterally; No wheezing.  HEART: Regular rate and rhythm; No murmurs, rubs, or gallops  ABDOMEN: Soft, Nontender, Nondistended; Bowel sounds present  EXTREMITIES:   No edema  NEUROLOGY: AAO X 3      LABS:                        10.6   16.73 )-----------( 248      ( 10 Sep 2023 07:00 )             33.9                   CAPILLARY BLOOD GLUCOSE            RADIOLOGY & ADDITIONAL TESTS:    Imaging Personally Reviewed:    Consultant(s) Notes Reviewed:      Care Discussed with Consultants/Other Providers:    Jame Andrade MD, CMD, FACP    [address] [address]  Office Tel: [phone]  Cell: [phone]  
[facility]  AMADO Rodrigues Y. Patel, S. Shah, G. Casimir  [phone]  ([phone] - weekdays after 5pm and weekends)    Name: CASE COOK  Age/Gender: 72y Female  MRN: [id number]    Interval History:  Patient seen and examined this morning.  Feels better today, no new complaints.  Notes reviewed  No concerning overnight events  Afebrile   Allergies: oxybutynin (Other)      Objective:  Vitals:   T(F): 98.5 (09-13-23 @ 14:23), Max: 98.5 (09-13-23 @ 14:23)  HR: 71 (09-13-23 @ 14:23) (71 - 74)  BP: 151/69 (09-13-23 @ 14:23) (101/70 - 155/76)  RR: 18 (09-13-23 @ 14:23) (17 - 18)  SpO2: 96% (09-13-23 @ 14:23) (95% - 96%)  Physical Examination:  General: no acute distress  HEENT: NC/AT, anicteric, dec L facial/neck TTP, neck supple  Respiratory: no acc muscle use, breathing comfortably  Cardiovascular: S1 and S2 present  Gastrointestinal: normal appearing, nondistended  Extremities: no edema, no cyanosis  Skin: no visible rash    Laboratory Studies:  CBC:                       10.5   10.38 )-----------( 282      ( 13 Sep 2023 07:30 )             34.1     WBC Trend:  10.38 09-13-23 @ 07:30  10.65 09-12-23 @ 07:15  15.30 09-11-23 @ 11:38  16.73 09-10-23 @ 07:00  14.20 09-08-23 @ 13:11    CMP: 09-13    139  |  103  |  11  ----------------------------<  109<H>  3.8   |  22  |  0.81    Ca    8.1<L>      13 Sep 2023 07:28      Creatinine: 0.81 mg/dL (09-13-23 @ 07:28)  Creatinine: 0.96 mg/dL (09-12-23 @ 07:17)  Creatinine: 0.82 mg/dL (09-11-23 @ 07:11)  Creatinine: 1.04 mg/dL (09-08-23 @ 13:11)    Microbiology: reviewed   Culture - Other (collected 09-08-23 @ 21:14)  Source: .Other Other  Final Report (09-11-23 @ 17:32):    Numerous Klebsiella pneumoniae ESBL Multiple Morphological Strains    Moderate Rehana albicans "Susceptibilities not performed"  Organism: Klebsiella pneumoniae ESBL  Klebsiella pneumoniae ESBL (09-11-23 @ 17:32)  Organism: Klebsiella pneumoniae ESBL (09-11-23 @ 17:32)      Method Type: JOANN      -  Amikacin: S <=16      -  Amoxicillin/Clavulanic Acid: S <=8/4      -  Ampicillin: R >16 These ampicillin results predict results for amoxicillin      -  Ampicillin/Sulbactam: R >16/8      -  Aztreonam: R >16      -  Cefazolin: R >16      -  Cefepime: R >16      -  Ceftriaxone: R >32      -  Ciprofloxacin: R 1      -  Ertapenem: S <=0.5      -  Gentamicin: S <=2      -  Imipenem: S <=1      -  Levofloxacin: I 1      -  Meropenem: S <=1      -  Piperacillin/Tazobactam: R <=8      -  Tobramycin: S <=2      -  Trimethoprim/Sulfamethoxazole: R >2/38  Organism: Klebsiella pneumoniae ESBL (09-11-23 @ 17:32)      Method Type: JOANN      -  Amikacin: S <=16      -  Amoxicillin/Clavulanic Acid: I 16/8      -  Ampicillin: R >16 These ampicillin results predict results for amoxicillin      -  Ampicillin/Sulbactam: R >16/8      -  Aztreonam: R >16      -  Cefazolin: R >16      -  Cefepime: R >16      -  Ceftriaxone: R >32      -  Ciprofloxacin: I 0.5      -  Ertapenem: S <=0.5      -  Gentamicin: R >8      -  Imipenem: S <=1      -  Levofloxacin: S <=0.5      -  Meropenem: S <=1      -  Piperacillin/Tazobactam: R <=8      -  Tobramycin: I 8      -  Trimethoprim/Sulfamethoxazole: R >2/38    Culture - Blood (collected 09-08-23 @ 13:00)  Source: .Blood Blood-Peripheral  Preliminary Report (09-12-23 @ 18:01):    No growth at 4 days    Culture - Blood (collected 09-08-23 @ 12:50)  Source: .Blood Blood-Peripheral  Preliminary Report (09-12-23 @ 18:01):    No growth at 4 days    Radiology: reviewed     Medications:  acetaminophen     Tablet .. 650 milliGRAM(s) Oral every 6 hours PRN  ertapenem  IVPB 1000 milliGRAM(s) IV Intermittent every 24 hours  heparin   Injectable 5000 Unit(s) SubCutaneous every 8 hours  influenza  Vaccine (HIGH DOSE) 0.7 milliLiter(s) IntraMuscular once  pantoprazole   Suspension 40 milliGRAM(s) Oral daily  traMADol 25 milliGRAM(s) Oral every 8 hours PRN    Current Antimicrobials:  ertapenem  IVPB 1000 milliGRAM(s) IV Intermittent every 24 hours    Prior/Completed Antimicrobials:  clindamycin IVPB  clindamycin IVPB  clindamycin IVPB  
ENT ISSUE/POD: Left facial swelling     HPI: 72-year-old female history of HTN, HLD, DMII, PAD status post left BKA in 2016, status post right hip replacement in 2014, presenting with 3-day history of atraumatic left facial swelling, pain, and intermittent subjective fevers and chills for 3 days. CT revealed Left ductal sialadenitis. Left parotiditis with a 2 mm obstructing calculus within the left parotid gland, Associated adjacent cellulitis of the left side of the face. ENT consulted for further evaluation. Per patient, she doesn't drink a lot of fluid on daily basis. She has been drinking less due to left facial pain. Endorses fever, chills, and difficulty opening mouth wide. But denies dysphagia, odynophagia or difficulty handling secretions. Currently, pt denies N/V, recent URI, SOB, rhinorrhea,  dysphonia, changes in voice or inability to tolerate secretions.        PAST MEDICAL & SURGICAL HISTORY:  Mild HTN      HLD (hyperlipidemia)      S/P below knee amputation, left      H/O total hip arthroplasty, left        Allergies    oxybutynin (Other)    Intolerances      MEDICATIONS  (STANDING):  ertapenem  IVPB 1000 milliGRAM(s) IV Intermittent every 24 hours  heparin   Injectable 5000 Unit(s) SubCutaneous every 8 hours  influenza  Vaccine (HIGH DOSE) 0.7 milliLiter(s) IntraMuscular once  pantoprazole   Suspension 40 milliGRAM(s) Oral daily    MEDICATIONS  (PRN):  acetaminophen     Tablet .. 650 milliGRAM(s) Oral every 6 hours PRN Temp greater or equal to 38C (100.4F), Mild Pain (1 - 3)  traMADol 25 milliGRAM(s) Oral every 8 hours PRN Moderate Pain (4 - 6)      Social History: see consult    Family history: see consult    ROS:   ENT: all negative except as noted in HPI   Pulm: denies SOB, cough, hemoptysis  Neuro: denies numbness/tingling, loss of sensation  Endo: denies heat/cold intolerance, excessive sweating      Vital Signs Last 24 Hrs  T(C): 36.8 (13 Sep 2023 04:53), Max: 36.9 (12 Sep 2023 20:48)  T(F): 98.2 (13 Sep 2023 04:53), Max: 98.4 (12 Sep 2023 20:48)  HR: 72 (13 Sep 2023 04:53) (72 - 74)  BP: 101/70 (13 Sep 2023 04:53) (101/70 - 155/76)  BP(mean): --  RR: 18 (13 Sep 2023 04:53) (17 - 18)  SpO2: 95% (13 Sep 2023 04:53) (95% - 96%)    Parameters below as of 13 Sep 2023 04:53  Patient On (Oxygen Delivery Method): room air                              10.5   10.38 )-----------( 282      ( 13 Sep 2023 07:30 )             34.1    09-13    139  |  103  |  11  ----------------------------<  109<H>  3.8   |  22  |  0.81    Ca    8.1<L>      13 Sep 2023 07:28           PHYSICAL EXAM:  Gen: NAD, sitting in wheel chair   Skin: No rashes, bruises, or lesions  Head: Normocephalic, Atraumatic  Face: no edema, erythema, or fluctuance. Parotid glands soft without mass  Eyes: no scleral injection  Nose: Nares bilaterally patent, no discharge  Mouth: very dry and dehydrated oral mucosa. mild trismus. no pus expressed from left parotid.  No Stridor / Drooling.   tongue/uvula midline, oropharynx clear.   Neck: Flat, supple, no lymphadenopathy, trachea midline, no masses  Lymphatic: No lymphadenopathy  Resp: breathing easily, no stridor  CV: no peripheral edema/cyanosis  GI: nondistended   Peripheral vascular: chronic right lower extremity 2+edema   Neuro: facial nerve intact, no facial droop      
[facility]  AMADO Rodrigues Y. Patel, S. Shah, G. Casimir  [phone]  ([phone] - weekdays after 5pm and weekends)    Name: CASE COOK  Age/Gender: 72y Female  MRN: [id number]    Interval History:  Patient seen and examined this morning, feels better today.   Has less pain and swelling with warm compresses, massages and sialogogues   Notes reviewed. No concerning overnight events. Afebrile   Allergies: oxybutynin (Other)      Objective:  Vitals:   T(F): 98.2 (09-12-23 @ 05:41), Max: 98.4 (09-11-23 @ 22:23)  HR: 73 (09-12-23 @ 11:14) (73 - 77)  BP: 151/74 (09-12-23 @ 11:14) (150/64 - 157/70)  RR: 18 (09-12-23 @ 05:41) (18 - 18)  SpO2: 96% (09-12-23 @ 11:14) (92% - 96%)  Physical Examination:  General: no acute distress  HEENT: NC/AT, anicteric, L facial/neck TTP, neck supple  Respiratory: no acc muscle use, breathing comfortably  Cardiovascular: S1 and S2 present  Gastrointestinal: normal appearing, nondistended  Extremities: no edema, no cyanosis  Skin: no visible rash    Laboratory Studies:  CBC:                       11.1   10.65 )-----------( 271      ( 12 Sep 2023 07:15 )             36.2     WBC Trend:  10.65 09-12-23 @ 07:15  15.30 09-11-23 @ 11:38  16.73 09-10-23 @ 07:00  14.20 09-08-23 @ 13:11    CMP: 09-12    137  |  102  |  16  ----------------------------<  113<H>  3.8   |  23  |  0.96    Ca    7.8<L>      12 Sep 2023 07:17      Creatinine: 0.96 mg/dL (09-12-23 @ 07:17)  Creatinine: 0.82 mg/dL (09-11-23 @ 07:11)  Creatinine: 1.04 mg/dL (09-08-23 @ 13:11)    Microbiology: reviewed   Culture - Other (collected 09-08-23 @ 21:14)  Source: .Other Other  Final Report (09-11-23 @ 17:32):    Numerous Klebsiella pneumoniae ESBL Multiple Morphological Strains    Moderate Rehana albicans "Susceptibilities not performed"  Organism: Klebsiella pneumoniae ESBL  Klebsiella pneumoniae ESBL (09-11-23 @ 17:32)  Organism: Klebsiella pneumoniae ESBL (09-11-23 @ 17:32)      Method Type: JOANN      -  Amikacin: S <=16      -  Amoxicillin/Clavulanic Acid: S <=8/4      -  Ampicillin: R >16 These ampicillin results predict results for amoxicillin      -  Ampicillin/Sulbactam: R >16/8      -  Aztreonam: R >16      -  Cefazolin: R >16      -  Cefepime: R >16      -  Ceftriaxone: R >32      -  Ciprofloxacin: R 1      -  Ertapenem: S <=0.5      -  Gentamicin: S <=2      -  Imipenem: S <=1      -  Levofloxacin: I 1      -  Meropenem: S <=1      -  Piperacillin/Tazobactam: R <=8      -  Tobramycin: S <=2      -  Trimethoprim/Sulfamethoxazole: R >2/38  Organism: Klebsiella pneumoniae ESBL (09-11-23 @ 17:32)      Method Type: JOANN      -  Amikacin: S <=16      -  Amoxicillin/Clavulanic Acid: I 16/8      -  Ampicillin: R >16 These ampicillin results predict results for amoxicillin      -  Ampicillin/Sulbactam: R >16/8      -  Aztreonam: R >16      -  Cefazolin: R >16      -  Cefepime: R >16      -  Ceftriaxone: R >32      -  Ciprofloxacin: I 0.5      -  Ertapenem: S <=0.5      -  Gentamicin: R >8      -  Imipenem: S <=1      -  Levofloxacin: S <=0.5      -  Meropenem: S <=1      -  Piperacillin/Tazobactam: R <=8      -  Tobramycin: I 8      -  Trimethoprim/Sulfamethoxazole: R >2/38    Culture - Blood (collected 09-08-23 @ 13:00)  Source: .Blood Blood-Peripheral  Preliminary Report (09-11-23 @ 18:02):    No growth at 72 Hours    Culture - Blood (collected 09-08-23 @ 12:50)  Source: .Blood Blood-Peripheral  Preliminary Report (09-11-23 @ 18:01):    No growth at 72 Hours    Radiology: reviewed     Medications:  acetaminophen     Tablet .. 650 milliGRAM(s) Oral every 6 hours PRN  ertapenem  IVPB 1000 milliGRAM(s) IV Intermittent every 24 hours  heparin   Injectable 5000 Unit(s) SubCutaneous every 8 hours  influenza  Vaccine (HIGH DOSE) 0.7 milliLiter(s) IntraMuscular once  pantoprazole   Suspension 40 milliGRAM(s) Oral daily  traMADol 25 milliGRAM(s) Oral every 8 hours PRN    Current Antimicrobials:  ertapenem  IVPB 1000 milliGRAM(s) IV Intermittent every 24 hours    Prior/Completed Antimicrobials:  clindamycin IVPB  clindamycin IVPB  clindamycin IVPB  
Patient is a 72y old  Female who presents with a chief complaint of The patient is a 72y Female complaining of  facial swelling, (09 Sep 2023 17:20)      SUBJECTIVE / OVERNIGHT EVENTS:    Events noted.  CONSTITUTIONAL: No fever,  or fatigue  RESPIRATORY: No cough, wheezing,  No shortness of breath  CARDIOVASCULAR: No chest pain, palpitations, dizziness, or leg swelling  GASTROINTESTINAL: No abdominal or epigastric pain.       MEDICATIONS  (STANDING):  ampicillin/sulbactam  IVPB 3 Gram(s) IV Intermittent every 6 hours  influenza  Vaccine (HIGH DOSE) 0.7 milliLiter(s) IntraMuscular once  pantoprazole   Suspension 40 milliGRAM(s) Oral daily    MEDICATIONS  (PRN):        CAPILLARY BLOOD GLUCOSE        I&O's Summary      T(C): 36.4 (09-09-23 @ 21:31), Max: 37.6 (09-09-23 @ 01:55)  HR: 85 (09-09-23 @ 21:31) (77 - 85)  BP: 124/67 (09-09-23 @ 21:31) (124/67 - 158/70)  RR: 18 (09-09-23 @ 21:31) (18 - 20)  SpO2: 96% (09-09-23 @ 21:31) (96% - 98%)    PHYSICAL EXAM:  GENERAL: NAD  NECK: Supple, No JVD  CHEST/LUNG: Clear to auscultation bilaterally; No wheezing.  HEART: Regular rate and rhythm; No murmurs, rubs, or gallops  ABDOMEN: Soft, Nontender, Nondistended; Bowel sounds present  EXTREMITIES:   No edema  NEUROLOGY: AAO X 3      LABS:                        12.0   14.20 )-----------( 222      ( 08 Sep 2023 13:11 )             39.2     09-08    143  |  104  |  11  ----------------------------<  152<H>  3.9   |  25  |  1.04    Ca    9.5      08 Sep 2023 13:11    TPro  8.7<H>  /  Alb  3.8  /  TBili  0.4  /  DBili  x   /  AST  26  /  ALT  35  /  AlkPhos  103  09-08    PT/INR - ( 08 Sep 2023 13:11 )   PT: 12.8 sec;   INR: 1.23 ratio         PTT - ( 08 Sep 2023 13:11 )  PTT:28.6 sec      Urinalysis Basic - ( 08 Sep 2023 13:11 )    Color: x / Appearance: x / SG: x / pH: x  Gluc: 152 mg/dL / Ketone: x  / Bili: x / Urobili: x   Blood: x / Protein: x / Nitrite: x   Leuk Esterase: x / RBC: x / WBC x   Sq Epi: x / Non Sq Epi: x / Bacteria: x      CAPILLARY BLOOD GLUCOSE            RADIOLOGY & ADDITIONAL TESTS:    Imaging Personally Reviewed:    Consultant(s) Notes Reviewed:      Care Discussed with Consultants/Other Providers:    Jame Andrade MD, CMD, FACP    [address] [address]  Office Tel: [phone]  Cell: [phone]  
Patient is a 72y old  Female who presents with a chief complaint of The patient is a 72y Female complaining of  facial swelling, (11 Sep 2023 15:31)      SUBJECTIVE / OVERNIGHT EVENTS:    Events noted.  CONSTITUTIONAL: No fever,  or fatigue  RESPIRATORY: No cough, wheezing,  No shortness of breath  CARDIOVASCULAR: No chest pain, palpitations, dizziness, or leg swelling  GASTROINTESTINAL: No abdominal or epigastric pain. No nausea, vomiting.    MEDICATIONS  (STANDING):  ertapenem  IVPB 1000 milliGRAM(s) IV Intermittent every 24 hours  influenza  Vaccine (HIGH DOSE) 0.7 milliLiter(s) IntraMuscular once  pantoprazole   Suspension 40 milliGRAM(s) Oral daily    MEDICATIONS  (PRN):  acetaminophen     Tablet .. 650 milliGRAM(s) Oral every 6 hours PRN Temp greater or equal to 38C (100.4F), Mild Pain (1 - 3)  traMADol 25 milliGRAM(s) Oral every 8 hours PRN Moderate Pain (4 - 6)        CAPILLARY BLOOD GLUCOSE        I&O's Summary    10 Sep 2023 07:01  -  11 Sep 2023 07:00  --------------------------------------------------------  IN: 800 mL / OUT: 400 mL / NET: 400 mL        T(C): 36.5 (09-11-23 @ 12:10), Max: 36.7 (09-11-23 @ 04:27)  HR: 70 (09-11-23 @ 12:10) (70 - 80)  BP: 132/69 (09-11-23 @ 12:10) (132/69 - 162/79)  RR: 18 (09-11-23 @ 12:10) (18 - 18)  SpO2: 96% (09-11-23 @ 12:10) (96% - 96%)    PHYSICAL EXAM:  GENERAL: NAD  NECK: Supple, No JVD  CHEST/LUNG: Clear to auscultation bilaterally; No wheezing.  HEART: Regular rate and rhythm; No murmurs, rubs, or gallops  ABDOMEN: Soft, Nontender, Nondistended; Bowel sounds present  EXTREMITIES:   No edema  NEUROLOGY: AAO X 3      LABS:                        10.7   15.30 )-----------( 247      ( 11 Sep 2023 11:38 )             34.2     09-11    136  |  103  |  19  ----------------------------<  76  4.0   |  16<L>  |  0.82    Ca    8.5      11 Sep 2023 07:11            Urinalysis Basic - ( 11 Sep 2023 07:11 )    Color: x / Appearance: x / SG: x / pH: x  Gluc: 76 mg/dL / Ketone: x  / Bili: x / Urobili: x   Blood: x / Protein: x / Nitrite: x   Leuk Esterase: x / RBC: x / WBC x   Sq Epi: x / Non Sq Epi: x / Bacteria: x      CAPILLARY BLOOD GLUCOSE            RADIOLOGY & ADDITIONAL TESTS:    Imaging Personally Reviewed:    Consultant(s) Notes Reviewed:      Care Discussed with Consultants/Other Providers:    Jame Andrade MD, CMD, FACP    [address] [address]  Office Tel: [phone]  Cell: [phone]  
Patient is a 72y old  Female who presents with a chief complaint of The patient is a 72y Female complaining of  facial swelling, (11 Sep 2023 15:31)      SUBJECTIVE / OVERNIGHT EVENTS:    Events noted.  CONSTITUTIONAL: No fever,  or fatigue  RESPIRATORY: No cough, wheezing,  No shortness of breath  CARDIOVASCULAR: No chest pain, palpitations, dizziness, or leg swelling  GASTROINTESTINAL: No abdominal or epigastric pain. No nausea, vomiting.    MEDICATIONS  (STANDING):  ertapenem  IVPB 1000 milliGRAM(s) IV Intermittent every 24 hours  influenza  Vaccine (HIGH DOSE) 0.7 milliLiter(s) IntraMuscular once  pantoprazole   Suspension 40 milliGRAM(s) Oral daily    MEDICATIONS  (PRN):  acetaminophen     Tablet .. 650 milliGRAM(s) Oral every 6 hours PRN Temp greater or equal to 38C (100.4F), Mild Pain (1 - 3)  traMADol 25 milliGRAM(s) Oral every 8 hours PRN Moderate Pain (4 - 6)        CAPILLARY BLOOD GLUCOSE        I&O's Summary    10 Sep 2023 07:01  -  11 Sep 2023 07:00  --------------------------------------------------------  IN: 800 mL / OUT: 400 mL / NET: 400 mL        T(C): 36.5 (09-11-23 @ 12:10), Max: 36.7 (09-11-23 @ 04:27)  HR: 70 (09-11-23 @ 12:10) (70 - 80)  BP: 132/69 (09-11-23 @ 12:10) (132/69 - 162/79)  RR: 18 (09-11-23 @ 12:10) (18 - 18)  SpO2: 96% (09-11-23 @ 12:10) (96% - 96%)    PHYSICAL EXAM:  GENERAL: NAD  NECK: Supple, No JVD  CHEST/LUNG: Clear to auscultation bilaterally; No wheezing.  HEART: Regular rate and rhythm; No murmurs, rubs, or gallops  ABDOMEN: Soft, Nontender, Nondistended; Bowel sounds present  EXTREMITIES:   No edema  NEUROLOGY: AAO X 3      LABS:                        10.7   15.30 )-----------( 247      ( 11 Sep 2023 11:38 )             34.2     09-11    136  |  103  |  19  ----------------------------<  76  4.0   |  16<L>  |  0.82    Ca    8.5      11 Sep 2023 07:11            Urinalysis Basic - ( 11 Sep 2023 07:11 )    Color: x / Appearance: x / SG: x / pH: x  Gluc: 76 mg/dL / Ketone: x  / Bili: x / Urobili: x   Blood: x / Protein: x / Nitrite: x   Leuk Esterase: x / RBC: x / WBC x   Sq Epi: x / Non Sq Epi: x / Bacteria: x      CAPILLARY BLOOD GLUCOSE            RADIOLOGY & ADDITIONAL TESTS:    Imaging Personally Reviewed:    Consultant(s) Notes Reviewed:      Care Discussed with Consultants/Other Providers:    Jame Andrade MD, CMD, FACP    [address] [place] [address] [address]  Office Tel: [phone]  Cell: [phone]  
Patient is a 72y old  Female who presents with a chief complaint of The patient is a 72y Female complaining of  facial swelling, (13 Sep 2023 14:29)      SUBJECTIVE / OVERNIGHT EVENTS:    Events noted.  CONSTITUTIONAL: No fever,  or fatigue  RESPIRATORY: No cough, wheezing,  No shortness of breath  CARDIOVASCULAR: No chest pain, palpitations, dizziness, or leg swelling  GASTROINTESTINAL: No abdominal or epigastric pain. No nausea, vomiting.  NEUROLOGICAL: No headache    MEDICATIONS  (STANDING):  chlorhexidine 2% Cloths 1 Application(s) Topical daily  ertapenem  IVPB 1000 milliGRAM(s) IV Intermittent every 24 hours  heparin   Injectable 5000 Unit(s) SubCutaneous every 8 hours  influenza  Vaccine (HIGH DOSE) 0.7 milliLiter(s) IntraMuscular once  pantoprazole   Suspension 40 milliGRAM(s) Oral daily    MEDICATIONS  (PRN):  acetaminophen     Tablet .. 650 milliGRAM(s) Oral every 6 hours PRN Temp greater or equal to 38C (100.4F), Mild Pain (1 - 3)  traMADol 25 milliGRAM(s) Oral every 8 hours PRN Moderate Pain (4 - 6)        CAPILLARY BLOOD GLUCOSE        I&O's Summary    12 Sep 2023 07:01  -  13 Sep 2023 07:00  --------------------------------------------------------  IN: 200 mL / OUT: 950 mL / NET: -750 mL    13 Sep 2023 07:01  -  13 Sep 2023 22:29  --------------------------------------------------------  IN: 810 mL / OUT: 1700 mL / NET: -890 mL        T(C): 36.9 (09-13-23 @ 21:28), Max: 36.9 (09-13-23 @ 14:23)  HR: 74 (09-13-23 @ 21:28) (71 - 74)  BP: 162/62 (09-13-23 @ 21:28) (101/70 - 162/62)  RR: 17 (09-13-23 @ 21:28) (17 - 18)  SpO2: 96% (09-13-23 @ 21:28) (95% - 96%)    PHYSICAL EXAM:  GENERAL: NAD  NECK: Supple, No JVD  CHEST/LUNG: Clear to auscultation bilaterally; No wheezing.  HEART: Regular rate and rhythm; No murmurs, rubs, or gallops  ABDOMEN: Soft, Nontender, Nondistended; Bowel sounds present  EXTREMITIES:   No edema  NEUROLOGY: AAO X 3      LABS:                        10.5   10.38 )-----------( 282      ( 13 Sep 2023 07:30 )             34.1     09-13    139  |  103  |  11  ----------------------------<  109<H>  3.8   |  22  |  0.81    Ca    8.1<L>      13 Sep 2023 07:28            Urinalysis Basic - ( 13 Sep 2023 07:28 )    Color: x / Appearance: x / SG: x / pH: x  Gluc: 109 mg/dL / Ketone: x  / Bili: x / Urobili: x   Blood: x / Protein: x / Nitrite: x   Leuk Esterase: x / RBC: x / WBC x   Sq Epi: x / Non Sq Epi: x / Bacteria: x      CAPILLARY BLOOD GLUCOSE            RADIOLOGY & ADDITIONAL TESTS:    Imaging Personally Reviewed:    Consultant(s) Notes Reviewed:      Care Discussed with Consultants/Other Providers:    Jame Andrade MD, CMD, FACP    [address] [address]  Office Tel: [phone]  Cell: [phone]  
ENT ISSUE/POD: Left facial swelling     HPI: 72-year-old female history of HTN, HLD, DMII, PAD status post left BKA in 2016, status post right hip replacement in 2014, presenting with 3-day history of atraumatic left facial swelling, pain, and intermittent subjective fevers and chills for 3 days. CT revealed Left ductal sialadenitis. Left parotiditis with a 2 mm obstructing calculus within the left parotid gland, Associated adjacent cellulitis of the left side of the face. Pt's symptoms improved significantly, tolerating PO. Denies dysphagia, odynophagia or difficulty handling secretions, fevers, N/V, recent URI, SOB, rhinorrhea, dysphonia, changes in voice or inability to tolerate secretions.        PAST MEDICAL & SURGICAL HISTORY:  Mild HTN      HLD (hyperlipidemia)      S/P below knee amputation, left      H/O total hip arthroplasty, left        Allergies    oxybutynin (Other)    Intolerances      MEDICATIONS  (STANDING):  chlorhexidine 2% Cloths 1 Application(s) Topical daily  ertapenem  IVPB 1000 milliGRAM(s) IV Intermittent every 24 hours  heparin   Injectable 5000 Unit(s) SubCutaneous every 8 hours  influenza  Vaccine (HIGH DOSE) 0.7 milliLiter(s) IntraMuscular once  pantoprazole   Suspension 40 milliGRAM(s) Oral daily    MEDICATIONS  (PRN):  acetaminophen     Tablet .. 650 milliGRAM(s) Oral every 6 hours PRN Temp greater or equal to 38C (100.4F), Mild Pain (1 - 3)  traMADol 25 milliGRAM(s) Oral every 8 hours PRN Moderate Pain (4 - 6)      Social History: see consult    Family history: see consult    ROS:   ENT: all negative except as noted in HPI   Pulm: denies SOB, cough, hemoptysis  Neuro: denies numbness/tingling, loss of sensation  Endo: denies heat/cold intolerance, excessive sweating      Vital Signs Last 24 Hrs  T(C): 36.8 (14 Sep 2023 04:25), Max: 36.9 (13 Sep 2023 14:23)  T(F): 98.3 (14 Sep 2023 04:25), Max: 98.5 (13 Sep 2023 14:23)  HR: 74 (14 Sep 2023 04:25) (71 - 74)  BP: 148/69 (14 Sep 2023 04:25) (148/69 - 162/62)  BP(mean): --  RR: 17 (14 Sep 2023 04:25) (17 - 18)  SpO2: 96% (13 Sep 2023 21:28) (96% - 96%)    Parameters below as of 14 Sep 2023 04:25  Patient On (Oxygen Delivery Method): room air                              11.1   10.42 )-----------( 293      ( 14 Sep 2023 06:51 )             36.1    09-13    139  |  103  |  11  ----------------------------<  109<H>  3.8   |  22  |  0.81    Ca    8.1<L>      13 Sep 2023 07:28    TPro  x   /  Alb  3.4  /  TBili  x   /  DBili  x   /  AST  x   /  ALT  x   /  AlkPhos  x   09-14       PHYSICAL EXAM:  Gen: NAD  Skin: No rashes, bruises, or lesions  Head: Normocephalic, Atraumatic  Face: no edema, erythema, or fluctuance. Parotid glands soft without mass  Eyes: no scleral injection  Nose: Nares bilaterally patent, no discharge  Mouth: Moist oral mucosa. no pus expressed from left kevin's duct.  No Drooling.   tongue/uvula midline, oropharynx clear.   Neck: Flat, supple, no lymphadenopathy, trachea midline, no masses  Lymphatic: No lymphadenopathy  Resp: breathing easily, no stridor  CV: no peripheral edema/cyanosis  GI: nondistended   Peripheral vascular: chronic right lower extremity 2+edema   Neuro: facial nerve intact, no facial droop

## 2023-09-14 NOTE — DISCHARGE NOTE PROVIDER - NSDCCPCAREPLAN_GEN_ALL_CORE_FT
PRINCIPAL DISCHARGE DIAGNOSIS  Diagnosis: Parotiditis  Assessment and Plan of Treatment: ABX per ID, ertapenem   - warm compresses over left parotid area. massage  - sialogogues (anything sour) Q4H to stimulate salivary secretion   - hydration    - pain control PRN  - Please follow up outpatient with ENT Dr Stein, please call  [phone]. Add: [address] , [place] [address].      SECONDARY DISCHARGE DIAGNOSES  Diagnosis: Fever  Assessment and Plan of Treatment:

## 2023-09-14 NOTE — DISCHARGE NOTE PROVIDER - PROVIDER TOKENS
PROVIDER:[TOKEN:[[id number]]],PROVIDER:[TOKEN:[[id number]]],FREE:[LAST:[dental clinic],PHONE:[phone]],FAX:[(   )    -],ADDRESS:[[address] [place] [address]]]

## 2023-09-14 NOTE — DISCHARGE NOTE PROVIDER - NSDCMRMEDTOKEN_GEN_ALL_CORE_FT
ertapenem 1 g injection: 1 gram(s) injectable once a day to complete 10 days of treatment  home supplements: zinc,  elderberry, tumeric, glucosamine, Penetrex pain gel, Calcium, vitamin C, Vitamin D   ertapenem 1 g injection: 1 gram(s) injectable once a day to complete 10 days of treatment  home supplements: zinc,  elderberry, tumeric, glucosamine, Penetrex pain gel, Calcium, vitamin C, Vitamin D  traMADol 50 mg oral tablet: 0.5 tab(s) orally every 8 hours as needed for  moderate pain MDD: 3

## 2023-11-04 NOTE — PROGRESS NOTE ADULT - PROBLEM SELECTOR PROBLEM 4
Patient has an appt with PCP at 10am and is already at her appt. Ailyn Rowland MA was updated that patient had questions.
S/P BKA (below knee amputation), left
33-year-old male with PMHx GERD presents ED complaining of severe diffuse abdominal pain associated with nausea starting this afternoon at 4pm. Says pain from lower abdomen extending to the chest and throat. Started vomiting after morphine at the ED. Per significant other, only minimal amount, says browish in color. Patient does endorse smoking marijuana, last dose in the afternoon, daily usage, says takes less than 1 g each time. Also take AG1 as supplement. Patient received 8 mg of Zofran with EMS had to Tylenol prior to arrival.  Also endorses chills, headache, chest pain. Denies sick contact.      - vitals stable    - WBC: 13.32, neutrophil at 11.93, lactate negative, cmp unremarkable, lipase negative    - CT A/P shows : Significant gastric distention and a few proximal fluid-filled small bowel loops with minimal distention, no transition point. Increased number of nonenlarged mesenteric lymph nodes. These findings are similar to prior study 2016; the differential diagnosis includes gastroparesis versus mild gastroenteritis and mesenteric adenitis.   - s/p 1L NaCl, AlOH/MgOH/simethicone, famotidine, 4mg morphine  
S/P BKA (below knee amputation), left

## 2024-04-14 NOTE — PHYSICAL THERAPY INITIAL EVALUATION ADULT - REFERRING PHYSICIAN, REHAB EVAL
[facility] Clinical Rounding Nurse Progress Report      SUBJECTIVE: Called to assess patient secondary to MD/nurse concern - increasing O2 needs .      Vitals:    04/14/24 1311 04/14/24 1344 04/14/24 1445 04/14/24 1526   BP:  122/64 127/87 (!) 132/52   Pulse:  69 68 60   Resp:  22 20 16   Temp:  98.6 °F (37 °C) 97.6 °F (36.4 °C)    TempSrc:  Axillary Axillary    SpO2: 94% 93% 96% 93%   Weight:       Height:              ASSESSMENT:  On arrival to room, I found patient to be in room with  at bedside. Patient is alert and oriented. Denies major pain. States her SOB is better. Patient with tachypnea and accessory muscle use. Patient's  states the patient's respirations seem less labored compared to earlier. Patient is currently on venturi mask at 50% with O2 saturation of 92%. Bilateral lung sounds diminished with fine crackles. Patient currently has gauze applied to both nares due to bloody drainage. Patient switched from nasal cannula to venturi mask due to ineffective breathing through nares with bloody drainage. All needs and concerns addressed with patient and patient's .    PLAN:  VS, labs, and progress notes reviewed. Will follow per [facility] Clinical Rounding Program protocol. Primary RN to call with concerns.    Albin Verdin RN  [place]: [phone]     Nalini, PT eval and treat, amb as tolamol

## 2024-05-24 ENCOUNTER — EMERGENCY (EMERGENCY)
Facility: HOSPITAL | Age: 74
LOS: 1 days | Discharge: HOME | End: 2024-05-24
Payer: MEDICARE

## 2024-05-24 VITALS
RESPIRATION RATE: 18 BRPM | DIASTOLIC BLOOD PRESSURE: 76 MMHG | TEMPERATURE: 98 F | HEART RATE: 99 BPM | SYSTOLIC BLOOD PRESSURE: 137 MMHG | OXYGEN SATURATION: 95 %

## 2024-05-24 VITALS
OXYGEN SATURATION: 96 % | RESPIRATION RATE: 18 BRPM | TEMPERATURE: 98 F | DIASTOLIC BLOOD PRESSURE: 79 MMHG | HEART RATE: 92 BPM | SYSTOLIC BLOOD PRESSURE: 185 MMHG

## 2024-05-24 DIAGNOSIS — Z96.642 PRESENCE OF LEFT ARTIFICIAL HIP JOINT: Chronic | ICD-10-CM

## 2024-05-24 DIAGNOSIS — Z89.512 ACQUIRED ABSENCE OF LEFT LEG BELOW KNEE: Chronic | ICD-10-CM

## 2024-05-24 LAB
ALBUMIN SERPL ELPH-MCNC: 3.8 G/DL — SIGNIFICANT CHANGE UP (ref 3.3–5)
ALP SERPL-CCNC: 78 U/L — SIGNIFICANT CHANGE UP (ref 40–120)
ALT FLD-CCNC: 23 U/L — SIGNIFICANT CHANGE UP (ref 10–45)
AMPHET UR-MCNC: NEGATIVE — SIGNIFICANT CHANGE UP
ANION GAP SERPL CALC-SCNC: 10 MMOL/L — SIGNIFICANT CHANGE UP (ref 5–17)
APAP SERPL-MCNC: <15 UG/ML — SIGNIFICANT CHANGE UP (ref 10–30)
APPEARANCE UR: CLEAR — SIGNIFICANT CHANGE UP
AST SERPL-CCNC: 40 U/L — SIGNIFICANT CHANGE UP (ref 10–40)
BARBITURATES UR SCN-MCNC: NEGATIVE — SIGNIFICANT CHANGE UP
BASOPHILS # BLD AUTO: 0.03 K/UL — SIGNIFICANT CHANGE UP (ref 0–0.2)
BASOPHILS NFR BLD AUTO: 0.4 % — SIGNIFICANT CHANGE UP (ref 0–2)
BENZODIAZ UR-MCNC: NEGATIVE — SIGNIFICANT CHANGE UP
BILIRUB SERPL-MCNC: 0.4 MG/DL — SIGNIFICANT CHANGE UP (ref 0.2–1.2)
BILIRUB UR-MCNC: NEGATIVE — SIGNIFICANT CHANGE UP
BUN SERPL-MCNC: 11 MG/DL — SIGNIFICANT CHANGE UP (ref 7–23)
CALCIUM SERPL-MCNC: 9.2 MG/DL — SIGNIFICANT CHANGE UP (ref 8.4–10.5)
CHLORIDE SERPL-SCNC: 103 MMOL/L — SIGNIFICANT CHANGE UP (ref 96–108)
CO2 SERPL-SCNC: 24 MMOL/L — SIGNIFICANT CHANGE UP (ref 22–31)
COCAINE METAB.OTHER UR-MCNC: NEGATIVE — SIGNIFICANT CHANGE UP
COLOR SPEC: YELLOW — SIGNIFICANT CHANGE UP
CREAT SERPL-MCNC: 0.82 MG/DL — SIGNIFICANT CHANGE UP (ref 0.5–1.3)
DIFF PNL FLD: NEGATIVE — SIGNIFICANT CHANGE UP
EGFR: 75 ML/MIN/1.73M2 — SIGNIFICANT CHANGE UP
EOSINOPHIL # BLD AUTO: 0.12 K/UL — SIGNIFICANT CHANGE UP (ref 0–0.5)
EOSINOPHIL NFR BLD AUTO: 1.7 % — SIGNIFICANT CHANGE UP (ref 0–6)
ETHANOL SERPL-MCNC: <10 MG/DL — SIGNIFICANT CHANGE UP (ref 0–10)
FLUAV AG NPH QL: SIGNIFICANT CHANGE UP
FLUBV AG NPH QL: SIGNIFICANT CHANGE UP
GLUCOSE SERPL-MCNC: 143 MG/DL — HIGH (ref 70–99)
GLUCOSE UR QL: NEGATIVE MG/DL — SIGNIFICANT CHANGE UP
HCT VFR BLD CALC: 38.1 % — SIGNIFICANT CHANGE UP (ref 34.5–45)
HGB BLD-MCNC: 12.2 G/DL — SIGNIFICANT CHANGE UP (ref 11.5–15.5)
IMM GRANULOCYTES NFR BLD AUTO: 0.3 % — SIGNIFICANT CHANGE UP (ref 0–0.9)
KETONES UR-MCNC: NEGATIVE MG/DL — SIGNIFICANT CHANGE UP
LEUKOCYTE ESTERASE UR-ACNC: NEGATIVE — SIGNIFICANT CHANGE UP
LYMPHOCYTES # BLD AUTO: 1.58 K/UL — SIGNIFICANT CHANGE UP (ref 1–3.3)
LYMPHOCYTES # BLD AUTO: 21.9 % — SIGNIFICANT CHANGE UP (ref 13–44)
MAGNESIUM SERPL-MCNC: 2.2 MG/DL — SIGNIFICANT CHANGE UP (ref 1.6–2.6)
MCHC RBC-ENTMCNC: 26.1 PG — LOW (ref 27–34)
MCHC RBC-ENTMCNC: 32 GM/DL — SIGNIFICANT CHANGE UP (ref 32–36)
MCV RBC AUTO: 81.6 FL — SIGNIFICANT CHANGE UP (ref 80–100)
METHADONE UR-MCNC: NEGATIVE — SIGNIFICANT CHANGE UP
MONOCYTES # BLD AUTO: 0.6 K/UL — SIGNIFICANT CHANGE UP (ref 0–0.9)
MONOCYTES NFR BLD AUTO: 8.3 % — SIGNIFICANT CHANGE UP (ref 2–14)
NEUTROPHILS # BLD AUTO: 4.87 K/UL — SIGNIFICANT CHANGE UP (ref 1.8–7.4)
NEUTROPHILS NFR BLD AUTO: 67.4 % — SIGNIFICANT CHANGE UP (ref 43–77)
NITRITE UR-MCNC: NEGATIVE — SIGNIFICANT CHANGE UP
NRBC # BLD: 0 /100 WBCS — SIGNIFICANT CHANGE UP (ref 0–0)
OPIATES UR-MCNC: NEGATIVE — SIGNIFICANT CHANGE UP
OXYCODONE UR-MCNC: NEGATIVE — SIGNIFICANT CHANGE UP
PCP SPEC-MCNC: SIGNIFICANT CHANGE UP
PCP UR-MCNC: NEGATIVE — SIGNIFICANT CHANGE UP
PH UR: 7.5 — SIGNIFICANT CHANGE UP (ref 5–8)
PLATELET # BLD AUTO: 209 K/UL — SIGNIFICANT CHANGE UP (ref 150–400)
POTASSIUM SERPL-MCNC: 5 MMOL/L — SIGNIFICANT CHANGE UP (ref 3.5–5.3)
POTASSIUM SERPL-SCNC: 5 MMOL/L — SIGNIFICANT CHANGE UP (ref 3.5–5.3)
PROT SERPL-MCNC: 8 G/DL — SIGNIFICANT CHANGE UP (ref 6–8.3)
PROT UR-MCNC: NEGATIVE MG/DL — SIGNIFICANT CHANGE UP
RBC # BLD: 4.67 M/UL — SIGNIFICANT CHANGE UP (ref 3.8–5.2)
RBC # FLD: 16.6 % — HIGH (ref 10.3–14.5)
RSV RNA NPH QL NAA+NON-PROBE: SIGNIFICANT CHANGE UP
SALICYLATES SERPL-MCNC: <2 MG/DL — LOW (ref 15–30)
SARS-COV-2 RNA SPEC QL NAA+PROBE: SIGNIFICANT CHANGE UP
SODIUM SERPL-SCNC: 137 MMOL/L — SIGNIFICANT CHANGE UP (ref 135–145)
SP GR SPEC: 1.01 — SIGNIFICANT CHANGE UP (ref 1–1.03)
THC UR QL: NEGATIVE — SIGNIFICANT CHANGE UP
UROBILINOGEN FLD QL: 0.2 MG/DL — SIGNIFICANT CHANGE UP (ref 0.2–1)
WBC # BLD: 7.22 K/UL — SIGNIFICANT CHANGE UP (ref 3.8–10.5)
WBC # FLD AUTO: 7.22 K/UL — SIGNIFICANT CHANGE UP (ref 3.8–10.5)

## 2024-05-24 PROCEDURE — 87637 SARSCOV2&INF A&B&RSV AMP PRB: CPT

## 2024-05-24 PROCEDURE — 93005 ELECTROCARDIOGRAM TRACING: CPT

## 2024-05-24 PROCEDURE — 99285 EMERGENCY DEPT VISIT HI MDM: CPT | Mod: GC

## 2024-05-24 PROCEDURE — 85025 COMPLETE CBC W/AUTO DIFF WBC: CPT

## 2024-05-24 PROCEDURE — 70450 CT HEAD/BRAIN W/O DYE: CPT | Mod: 26,MC

## 2024-05-24 PROCEDURE — 70450 CT HEAD/BRAIN W/O DYE: CPT | Mod: MC

## 2024-05-24 PROCEDURE — 99285 EMERGENCY DEPT VISIT HI MDM: CPT | Mod: 25

## 2024-05-24 PROCEDURE — 80053 COMPREHEN METABOLIC PANEL: CPT

## 2024-05-24 PROCEDURE — 80307 DRUG TEST PRSMV CHEM ANLYZR: CPT

## 2024-05-24 PROCEDURE — 83735 ASSAY OF MAGNESIUM: CPT

## 2024-05-24 PROCEDURE — 71045 X-RAY EXAM CHEST 1 VIEW: CPT | Mod: 26

## 2024-05-24 PROCEDURE — 93010 ELECTROCARDIOGRAM REPORT: CPT

## 2024-05-24 PROCEDURE — 87086 URINE CULTURE/COLONY COUNT: CPT

## 2024-05-24 PROCEDURE — 71045 X-RAY EXAM CHEST 1 VIEW: CPT

## 2024-05-24 PROCEDURE — 81003 URINALYSIS AUTO W/O SCOPE: CPT

## 2024-05-24 NOTE — ED PROVIDER NOTE - OBJECTIVE STATEMENT
73F PMH HTN, HLD, s/p Right hip replacement (2014), PAD s/p BKA (2016) Presenting to [facility] department from the [facility] (wear her quadriplegic son currently is a patient) for concern for delirium/agitation, pt at bedside with son for days-weeks, pt now confused/agitated/difficult with hosp staff, having visual hallucinations (sees people in the bathroom, thinks people are assaulting her and her son). Pt denies complaints at this time. Pt story is nonlinear on initial ED eval. Spoke with Teresa YORK at [facility] for further collateral.

## 2024-05-24 NOTE — ED PROVIDER NOTE - CHIEF COMPLAINT
Pharmacy Note: Dietary Supplement Discontinuation Per Policy    Cranberry Juice Extract CAPS 1 capsule has been discontinued on [employer] per policy. This supplement may be restarted upon discharge using the medication reconciliation process.     Richardson Goodpasture The patient is a 73y Female complaining of hallucinations.

## 2024-05-24 NOTE — ED ADULT TRIAGE NOTE - CCCP TRG CHIEF CMPLNT
OT Daily Note  Time In 1300   Time Out 1347     Pain: Patient had no complaint of pain. Functional Mobility   Pt transferred with CGA throughout session. Neuro-Muscular Re-Education   Pt engaged in tall kneeling with mod A to maintain balance and a medicine ball for UE support. Pt engaged in reaching to the R to promote RLE WB. Attempted quadruped, but RUE has too much flexion synergy to safely complete position. Pt demonstrated good delayed awareness of RUE. Pt would be residential through a roll and realize it was not in a good position. Pt engaged in RLE WB while completing a puzzle with min A. Pt is demonstrating improvement with weight shifting. Education   Importance of weight bearing     Interdisciplinary Communication: PT Walt on brace wearing schedule    Plan: Continue with POC. Pt was left in bed with all needs within reach.      Sai Valdes OTR/L  10/18/2017 hallucinations

## 2024-05-24 NOTE — ED PROVIDER NOTE - PHYSICAL EXAMINATION
GENERAL: Awake. Alert. NAD. Well nourished.  HEENT: NC/AT, Conjunctiva pink, no scleral icterus. Airway patent.   LUNGS: CTAB. No wheezes or rales noted.  CARDIAC: RRR  EXT: distal pulses 2+ in RLE  NEURO: Moving all extremities. Sensation and strength intact throughout.   SKIN: Warm and dry. Chronic venous stasis changes ar RLE with scarring. BKA at the L.  PSYCH: Normal affect.

## 2024-05-24 NOTE — ED ADULT NURSE NOTE - NSFALLHARMRISKINTERV_ED_ALL_ED

## 2024-05-24 NOTE — ED ADULT NURSE NOTE - CAS EDP DISCH TYPE
Increase the amount of Insulin glargine (Lantus) you inject every night to 35 units  Start taking 2 pills of metformin (1000mg total) twice a day (4 total pills per day)   your new glucose monitor at your pharmacy  Please make an appointment with the dietician  Please make an appointment with diabetes education      Please make an appointment with the eye doctor for your diabetic eye exam  Home

## 2024-05-24 NOTE — ED PROVIDER NOTE - PROGRESS NOTE DETAILS
Emergency Medicine / Medical Toxicology Attending MD Cedillo: progress note  Patient sleeping comfortably.  CT head, labs, ECG all unremarkable.  UA pending.   More collateral obtained from patient's boyfriend Ricci Stafford, who has known her for 34 years.  He, the patient and the patient's Sister Florencia all live together.  He feels she is also suffering from extreme exhaustion and would benefit from sleeping in her own bed tonight.  He is more than comfortable taking her home today.  Urinalysis is pending. Alonso, PGY3 - Received signout on patient.  73-year-old woman presenting due to decreased sleep, agitation and RCU due to her son being admitted.  Patient has been experiencing hallucinations.  Labs and chest x-ray nonactionable, chest x-ray radiology states that there is some pulmonary congestion however patient has no shortness of breath, eating without any significant distress, states that she feels much improved.  No UTI. Patient stable for discharge. Understands the Emergency Room work-up and discharge precautions. Will follow-up with pcp
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
## 2024-05-24 NOTE — ED ADULT NURSE NOTE - OBJECTIVE STATEMENT
73 year old female with hallucinations; pt was at her disabled son's bedside this morning when staff noticed her acting bizarre, sent down for eval; pt has a false leg and BKA amputation and two canes, staff assisted her to stretcher; pt is pleasant and controlled on approach, unreliable historian, she states "I was seeing my son upstairs and there was someone in the room bothering me. Then I noticed a rat in the bed and then the next thing I knew there was all of this blood coming out of the behind of the thing and it went all over the bed"; pt states she has two home health aides at home; pt denies KALPESH, denies AVH, denies ETOH and drug use; continue to monitor.
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
## 2024-05-24 NOTE — ED PROVIDER NOTE - PATIENT PORTAL LINK FT
You can access the FollowMyHealth Patient Portal offered by [facility] by registering at the following website: [email or web address]. By joining [employer]’s FollowMyHealth portal, you will also be able to view your health information using other applications (apps) compatible with our system.

## 2024-05-24 NOTE — ED PROVIDER NOTE - NSFOLLOWUPINSTRUCTIONS_ED_ALL_ED_FT
soft/no guarding/bowel sounds normal/no rebound tenderness/no distention/nontender bowel sounds normal/no guarding/soft/no rigidity/nontender/no distention no distention/bowel sounds normal/no guarding/soft/nontender/no rigidity soft/no rigidity/no guarding/no distention/bowel sounds normal no distention/no rigidity/nontender/bowel sounds normal/no guarding/soft soft/nontender/no distention/bowel sounds normal Please follow up with your primary care doctor within 1 week. Return for worsening symptoms such as those listed below.    Results are attached to this document, please bring to your appointment.     SEEK IMMEDIATE MEDICAL CARE IF YOU HAVE ANY OF THE FOLLOWING SYMPTOMS: severe back or abdominal pain, fever, inability to keep fluids or medicine down, worsening confusion, worsening leg swelling and reddness, dizziness/lightheadedness, or a change in mental status. You were seen in the Emergency Room today. A copy of your results is included in your discharge paperwork.     Please follow up with your primary care doctor within 1 week.   Continue taking all your medications as prescribed.    SEEK IMMEDIATE MEDICAL CARE IF YOU HAVE ANY OF THE FOLLOWING SYMPTOMS: severe back or abdominal pain, fever, inability to keep fluids or medicine down, worsening confusion, worsening leg swelling and redness, dizziness/lightheadedness, a change in mental status, or any new or concerning symptoms.

## 2024-05-26 LAB
CULTURE RESULTS: SIGNIFICANT CHANGE UP
SPECIMEN SOURCE: SIGNIFICANT CHANGE UP

## 2025-07-19 NOTE — H&P ADULT - NSHPPHYSICALEXAM_GEN_ALL_CORE
PHYSICAL EXAM: vital signs noted on [place]  in no apparent distress  HEENT: ARACELIS EOMI  right facial firm swelling no crepitus + tenderness   Neck: Supple, no JVD, no thyromegaly  Lungs: no wheeze, no crackles  CVS: S1 S2 no M/R/G  Abdomen: no tenderness, no organomegaly, BS present  Neuro: AO x 3 no focal weakness, no sensory abnormalities  Psych: appropriate affect  Skin: warm, dry  Ext: no cyanosis or clubbing, no edema right leg  left BKA stump healthy  Msk: no joint swelling or deformities  Back: no CVA tenderness, no kyphosis/scoliosis RLQ abdominal pain/N/groin pain